# Patient Record
Sex: MALE | Race: BLACK OR AFRICAN AMERICAN | NOT HISPANIC OR LATINO | Employment: UNEMPLOYED | URBAN - METROPOLITAN AREA
[De-identification: names, ages, dates, MRNs, and addresses within clinical notes are randomized per-mention and may not be internally consistent; named-entity substitution may affect disease eponyms.]

---

## 2017-04-20 ENCOUNTER — OFFICE VISIT (OUTPATIENT)
Dept: MEDICAL GROUP | Facility: MEDICAL CENTER | Age: 62
End: 2017-04-20
Payer: COMMERCIAL

## 2017-04-20 VITALS
RESPIRATION RATE: 16 BRPM | BODY MASS INDEX: 30.66 KG/M2 | OXYGEN SATURATION: 95 % | TEMPERATURE: 98.3 F | HEIGHT: 71 IN | WEIGHT: 219 LBS | HEART RATE: 86 BPM | DIASTOLIC BLOOD PRESSURE: 78 MMHG | SYSTOLIC BLOOD PRESSURE: 132 MMHG

## 2017-04-20 DIAGNOSIS — J44.9 CHRONIC OBSTRUCTIVE PULMONARY DISEASE, UNSPECIFIED COPD TYPE (HCC): ICD-10-CM

## 2017-04-20 DIAGNOSIS — R05.9 COUGH: ICD-10-CM

## 2017-04-20 DIAGNOSIS — I10 HTN (HYPERTENSION), BENIGN: Chronic | ICD-10-CM

## 2017-04-20 PROCEDURE — 99214 OFFICE O/P EST MOD 30 MIN: CPT | Performed by: INTERNAL MEDICINE

## 2017-04-20 NOTE — MR AVS SNAPSHOT
"        Zander Domínguez   2017 3:40 PM   Office Visit   MRN: [id number]    Department:  [address] [place] [address]   Dept Phone:  [phone]    Description:  Male : 1955   Provider:  Perfecto Reynaga M.D.           Reason for Visit     Follow-Up COPD      Allergies as of 2017     No Known Allergies      You were diagnosed with     HTN (hypertension), benign   [113245]       Chronic obstructive pulmonary disease, unspecified COPD type (CMS-HCC)   [1626354]       Cough   [786.2.ICD-9-CM]         Vital Signs     Blood Pressure Pulse Temperature Respirations Height Weight    132/78 mmHg 86 36.8 °C (98.3 °F) 16 1.803 m (5' 11\") 99.338 kg (219 lb)    Body Mass Index Oxygen Saturation Smoking Status             30.56 kg/m2 95% Former Smoker         Basic Information     Date Of Birth Sex Race Ethnicity Preferred Language    1955 Male Black or  Non- English      Your appointments     2017  3:40 PM   Established Patient with Perfecto Reynaga M.D.   [facility] [address] ([facility])    [address] [place] [address]  [facility] [address]   [phone]           You will be receiving a confirmation call a few days before your appointment from our automated call confirmation system.              Problem List              ICD-10-CM Priority Class Noted - Resolved    HTN (hypertension), benign (Chronic) I10   3/16/2012 - Present    Back pain M54.9   2012 - Present    Dyslipidemia E78.5   2015 - Present    COPD J44.9   5/3/2016 - Present    History of tobacco use Z87.891   2016 - Present      Health Maintenance        Date Due Completion Dates    IMM ZOSTER VACCINE 2015 ---    COLONOSCOPY 2018 (Done), 5/3/2012 (Done)    Override on 2015: Done    Override on 5/3/2012: Done    IMM DTaP/Tdap/Td Vaccine (2 - Td) 2022            Current Immunizations     Pneumococcal polysaccharide vaccine (PPSV-23) 2016    Tdap Vaccine 2012  " 9:35 AM      Below and/or attached are the medications your provider expects you to take. Review all of your home medications and newly ordered medications with your provider and/or pharmacist. Follow medication instructions as directed by your provider and/or pharmacist. Please keep your medication list with you and share with your provider. Update the information when medications are discontinued, doses are changed, or new medications (including over-the-counter products) are added; and carry medication information at all times in the event of emergency situations     Allergies:  No Known Allergies          Medications  Valid as of: April 20, 2017 -  3:23 PM    Generic Name Brand Name Tablet Size Instructions for use    Albuterol Sulfate (Aero Soln) albuterol 108 (90 BASE) MCG/ACT Inhale 2 Puffs by mouth every four hours as needed for Shortness of Breath. AS NEEDED FOR SHORTNESS OF BREATH        AmLODIPine Besylate (Tab) NORVASC 5 MG Take 1 Tab by mouth every day.        Ipratropium-Albuterol (Aero Soln) COMBIVENT RESPIMAT  MCG/ACT Inhale 1 Puff by mouth 2 Times a Day.        Lisinopril-Hydrochlorothiazide (Tab) PRINZIDE, ZESTORETIC 20-12.5 MG Take 1 Tab by mouth 2 times a day. TWICE DAILY        MethylPREDNISolone (Kit) MEDROL DOSEPACK 4 MG Per package insert.        Umeclidinium-Vilanterol (AEROSOL POWDER, BREATH ACTIVATED) ANORO ELLIPTA 62.5-25 MCG/INH Inhale 1 Puff by mouth every day.        .                 Medicines prescribed today were sent to:     [id number]  [place], [place] - [address] [facility] [place] [address]    Phone: [phone] Fax: [phone]    Open 24 Hours?: No      Medication refill instructions:       If your prescription bottle indicates you have medication refills left, it is not necessary to call your provider’s office. Please contact your pharmacy and they will refill your medication.    If your prescription bottle  indicates you do not have any refills left, you may request refills at any time through one of the following ways: The online [employer] system (except Urgent Care), by calling your provider’s office, or by asking your pharmacy to contact your provider’s office with a refill request. Medication refills are processed only during regular business hours and may not be available until the next business day. Your provider may request additional information or to have a follow-up visit with you prior to refilling your medication.   *Please Note: Medication refills are assigned a new Rx number when refilled electronically. Your pharmacy may indicate that no refills were authorized even though a new prescription for the same medication is available at the pharmacy. Please request the medicine by name with the pharmacy before contacting your provider for a refill.        Your To Do List     Future Labs/Procedures Complete By Expires    DX-CHEST-2 VIEWS  As directed 4/20/2018         [employer] Status: Patient Declined

## 2017-04-20 NOTE — PROGRESS NOTES
"CC: Follow-up multiple issues    HPI:   Zander presents today with the following.    1. HTN (hypertension), benign  Blood pressure well controlled denying any chest pain shortness breath reports compliance with medication.    2. Chronic obstructive pulmonary disease, unspecified COPD type (CMS-HCC)  Reports compliance to his inhalers but is having some increased difficulty breathing intermittently. He does not uses [employer] inhaler frequently. He is well overdue for blood work that we had ordered. He was set up for a lung cancer screening but cannot afford the co-pay.    3. Cough  He is having a mild cough again no fevers or hemoptysis. No sore throat or earaches.      Patient Active Problem List    Diagnosis Date Noted   • History of tobacco use 08/05/2016   • COPD 05/03/2016   • Dyslipidemia 07/20/2015   • Back pain 05/25/2012   • HTN (hypertension), benign 03/16/2012       Current Outpatient Prescriptions   Medication Sig Dispense Refill   • ANORO ELLIPTA 62.5-25 MCG/INH AEROSOL POWDER, BREATH ACTIVATED Inhale 1 Puff by mouth every day. 1 Each 5   • amlodipine (NORVASC) 5 MG Tab Take 1 Tab by mouth every day. 90 Tab 3   • ipratropium-albuterol (COMBIVENT RESPIMAT)  MCG/ACT Aero Soln Inhale 1 Puff by mouth 2 Times a Day. 1 Inhaler 11   • albuterol (VENTOLIN HFA) 108 (90 BASE) MCG/ACT Aero Soln inhalation aerosol Inhale 2 Puffs by mouth every four hours as needed for Shortness of Breath. AS NEEDED FOR SHORTNESS OF BREATH 18 g 6   • lisinopril-hydrochlorothiazide (PRINZIDE, ZESTORETIC) 20-12.5 MG per tablet Take 1 Tab by mouth 2 times a day. TWICE DAILY 180 Tab 3   • methylPREDNISolone (MEDROL DOSEPACK) 4 MG tablet Per package insert. 1 Kit 0     No current facility-administered medications for this visit.         Allergies as of 04/20/2017   • (No Known Allergies)        ROS: As per HPI.    /78 mmHg  Pulse 86  Temp(Src) 36.8 °C (98.3 °F)  Resp 16  Ht 1.803 m (5' 11\")  Wt 99.338 kg (219 lb)  BMI " 30.56 kg/m2  SpO2 95%    Physical Exam:  Gen:         Alert and oriented, No apparent distress.  Neck:        No Lymphadenopathy or Bruits.  Lungs:     Clear to auscultation bilaterally  CV:          Regular rate and rhythm. No murmurs, rubs or gallops.               Ext:          No clubbing, cyanosis, edema.      Assessment and Plan.   61 y.o. male with the following issues.    1. HTN (hypertension), benign  Currently well controlled, Discuss diet, exercise and salt restriction. Encouraged to get blood work done.    2. Chronic obstructive pulmonary disease, unspecified COPD type (CMS-HCC)  Continue current inhalers one over proper timing for medications and rescue inhalers.  - DX-CHEST-2 VIEWS; Future    3. Cough  Have written for chest x-ray.  - DX-CHEST-2 VIEWS; Future

## 2017-05-18 ENCOUNTER — HOSPITAL ENCOUNTER (OUTPATIENT)
Dept: RADIOLOGY | Facility: MEDICAL CENTER | Age: 62
End: 2017-05-18
Attending: INTERNAL MEDICINE
Payer: COMMERCIAL

## 2017-05-18 ENCOUNTER — HOSPITAL ENCOUNTER (OUTPATIENT)
Dept: LAB | Facility: MEDICAL CENTER | Age: 62
End: 2017-05-18
Attending: INTERNAL MEDICINE
Payer: COMMERCIAL

## 2017-05-18 DIAGNOSIS — J44.9 CHRONIC OBSTRUCTIVE PULMONARY DISEASE, UNSPECIFIED COPD TYPE (HCC): ICD-10-CM

## 2017-05-18 DIAGNOSIS — E78.5 DYSLIPIDEMIA: ICD-10-CM

## 2017-05-18 DIAGNOSIS — R05.9 COUGH: ICD-10-CM

## 2017-05-18 LAB
ALBUMIN SERPL BCP-MCNC: 4.1 G/DL (ref 3.2–4.9)
ALBUMIN/GLOB SERPL: 1.5 G/DL
ALP SERPL-CCNC: 54 U/L (ref 30–99)
ALT SERPL-CCNC: 23 U/L (ref 2–50)
ANION GAP SERPL CALC-SCNC: 6 MMOL/L (ref 0–11.9)
AST SERPL-CCNC: 19 U/L (ref 12–45)
BILIRUB SERPL-MCNC: 0.9 MG/DL (ref 0.1–1.5)
BUN SERPL-MCNC: 11 MG/DL (ref 8–22)
CALCIUM SERPL-MCNC: 9.8 MG/DL (ref 8.5–10.5)
CHLORIDE SERPL-SCNC: 102 MMOL/L (ref 96–112)
CHOLEST SERPL-MCNC: 218 MG/DL (ref 100–199)
CO2 SERPL-SCNC: 29 MMOL/L (ref 20–33)
CREAT SERPL-MCNC: 0.95 MG/DL (ref 0.5–1.4)
GFR SERPL CREATININE-BSD FRML MDRD: >60 ML/MIN/1.73 M 2
GLOBULIN SER CALC-MCNC: 2.8 G/DL (ref 1.9–3.5)
GLUCOSE SERPL-MCNC: 97 MG/DL (ref 65–99)
HDLC SERPL-MCNC: 70 MG/DL
LDLC SERPL CALC-MCNC: 137 MG/DL
POTASSIUM SERPL-SCNC: 3.9 MMOL/L (ref 3.6–5.5)
PROT SERPL-MCNC: 6.9 G/DL (ref 6–8.2)
SODIUM SERPL-SCNC: 137 MMOL/L (ref 135–145)
TRIGL SERPL-MCNC: 53 MG/DL (ref 0–149)

## 2017-05-18 PROCEDURE — 80061 LIPID PANEL: CPT

## 2017-05-18 PROCEDURE — 36415 COLL VENOUS BLD VENIPUNCTURE: CPT

## 2017-05-18 PROCEDURE — 80053 COMPREHEN METABOLIC PANEL: CPT

## 2017-05-18 PROCEDURE — 71020 DX-CHEST-2 VIEWS: CPT

## 2017-06-08 ENCOUNTER — NON-PROVIDER VISIT (OUTPATIENT)
Dept: OCCUPATIONAL MEDICINE | Facility: CLINIC | Age: 62
End: 2017-06-08

## 2017-06-08 DIAGNOSIS — Z02.1 PRE-EMPLOYMENT DRUG SCREENING: ICD-10-CM

## 2017-06-08 LAB
AMP AMPHETAMINE: NORMAL
COC COCAINE: NORMAL
INT CON NEG: NORMAL
INT CON POS: NORMAL
MET METHAMPHETAMINES: NORMAL
OPI OPIATES: NORMAL
PCP PHENCYCLIDINE: NORMAL
POC DRUG COMMENT 753798-OCCUPATIONAL HEALTH: NEGATIVE
THC: NORMAL

## 2017-06-08 PROCEDURE — 80305 DRUG TEST PRSMV DIR OPT OBS: CPT | Performed by: PREVENTIVE MEDICINE

## 2017-08-18 ENCOUNTER — NON-PROVIDER VISIT (OUTPATIENT)
Dept: OCCUPATIONAL MEDICINE | Facility: CLINIC | Age: 62
End: 2017-08-18

## 2017-08-18 ENCOUNTER — EMPLOYEE HEALTH (OUTPATIENT)
Dept: OCCUPATIONAL MEDICINE | Facility: CLINIC | Age: 62
End: 2017-08-18

## 2017-08-18 DIAGNOSIS — Z02.1 PRE-EMPLOYMENT HEALTH SCREENING EXAMINATION: ICD-10-CM

## 2017-08-18 PROCEDURE — 80305 DRUG TEST PRSMV DIR OPT OBS: CPT | Performed by: PREVENTIVE MEDICINE

## 2017-08-18 PROCEDURE — 7100 PR DOT PHYSICAL: Performed by: PREVENTIVE MEDICINE

## 2017-11-29 DIAGNOSIS — I10 HTN (HYPERTENSION), BENIGN: Chronic | ICD-10-CM

## 2017-11-29 RX ORDER — AMLODIPINE BESYLATE 5 MG/1
TABLET ORAL
Qty: 90 TAB | Refills: 0 | Status: SHIPPED | OUTPATIENT
Start: 2017-11-29 | End: 2017-12-12

## 2017-12-07 ENCOUNTER — OFFICE VISIT (OUTPATIENT)
Dept: INTERNAL MEDICINE | Facility: MEDICAL CENTER | Age: 62
End: 2017-12-07
Payer: MEDICAID

## 2017-12-07 VITALS
TEMPERATURE: 98.3 F | BODY MASS INDEX: 32.9 KG/M2 | HEART RATE: 83 BPM | SYSTOLIC BLOOD PRESSURE: 142 MMHG | WEIGHT: 235 LBS | DIASTOLIC BLOOD PRESSURE: 94 MMHG | OXYGEN SATURATION: 90 % | HEIGHT: 71 IN

## 2017-12-07 DIAGNOSIS — I10 HYPERTENSION, UNSPECIFIED TYPE: ICD-10-CM

## 2017-12-07 DIAGNOSIS — Z29.9 PREVENTIVE MEASURE: ICD-10-CM

## 2017-12-07 DIAGNOSIS — Z86.39 HISTORY OF VITAMIN D DEFICIENCY: ICD-10-CM

## 2017-12-07 DIAGNOSIS — J44.9 CHRONIC OBSTRUCTIVE PULMONARY DISEASE, UNSPECIFIED COPD TYPE (HCC): ICD-10-CM

## 2017-12-07 DIAGNOSIS — E78.5 DYSLIPIDEMIA: ICD-10-CM

## 2017-12-07 DIAGNOSIS — E66.9 OBESITY (BMI 30-39.9): ICD-10-CM

## 2017-12-07 DIAGNOSIS — J44.1 ACUTE EXACERBATION OF CHRONIC OBSTRUCTIVE PULMONARY DISEASE (COPD) (HCC): ICD-10-CM

## 2017-12-07 PROCEDURE — 99204 OFFICE O/P NEW MOD 45 MIN: CPT | Mod: GC | Performed by: INTERNAL MEDICINE

## 2017-12-07 RX ORDER — ALBUTEROL SULFATE 2.5 MG/3ML
2.5 SOLUTION RESPIRATORY (INHALATION) ONCE
Status: COMPLETED | OUTPATIENT
Start: 2017-12-07 | End: 2017-12-07

## 2017-12-07 RX ORDER — METHYLPREDNISOLONE 4 MG/1
TABLET ORAL
Qty: 21 TAB | Refills: 0 | Status: SHIPPED | OUTPATIENT
Start: 2017-12-07 | End: 2018-03-28

## 2017-12-07 RX ORDER — TIOTROPIUM BROMIDE 18 UG/1
18 CAPSULE ORAL; RESPIRATORY (INHALATION) DAILY
Qty: 30 CAP | Refills: 3 | Status: SHIPPED | OUTPATIENT
Start: 2017-12-07 | End: 2018-08-17

## 2017-12-07 RX ORDER — IPRATROPIUM BROMIDE AND ALBUTEROL SULFATE 2.5; .5 MG/3ML; MG/3ML
3 SOLUTION RESPIRATORY (INHALATION) ONCE
Status: DISCONTINUED | OUTPATIENT
Start: 2017-12-07 | End: 2017-12-07

## 2017-12-07 RX ADMIN — ALBUTEROL SULFATE 2.5 MG: 2.5 SOLUTION RESPIRATORY (INHALATION) at 15:04

## 2017-12-07 ASSESSMENT — PATIENT HEALTH QUESTIONNAIRE - PHQ9: CLINICAL INTERPRETATION OF PHQ2 SCORE: 0

## 2017-12-07 NOTE — PATIENT INSTRUCTIONS
Chronic Obstructive Pulmonary Disease  Chronic obstructive pulmonary disease (COPD) is a common lung problem. In COPD, the flow of air from the lungs is limited. The way your lungs work will probably never return to normal, but there are things you can do to improve your lungs and make yourself feel better. Your doctor may treat your condition with:  · Medicines.  · Oxygen.  · Lung surgery.  · Changes to your diet.  · Rehabilitation. This may involve a team of specialists.  HOME CARE  · Take all medicines as told by your doctor.  · Avoid medicines or cough syrups that dry up your airway (such as antihistamines) and do not allow you to get rid of thick spit. You do not need to avoid them if told differently by your doctor.  · If you smoke, stop. Smoking makes the problem worse.  · Avoid being around things that make your breathing worse (like smoke, chemicals, and fumes).  · Use oxygen therapy and therapy to help improve your lungs (pulmonary rehabilitation) if told by your doctor. If you need home oxygen therapy, ask your doctor if you should buy a tool to measure your oxygen level (oximeter).  · Avoid people who have a sickness you can catch (contagious).  · Avoid going outside when it is very hot, cold, or humid.  · Eat healthy foods. Eat smaller meals more often. Rest before meals.  · Stay active, but remember to also rest.  · Make sure to get all the shots (vaccines) your doctor recommends. Ask your doctor if you need a pneumonia shot.  · Learn and use tips on how to relax.  · Learn and use tips on how to control your breathing as told by your doctor. Try:  ¨ Breathing in (inhaling) through your nose for 1 second. Then, pucker your lips and breath out (exhale) through your lips for 2 seconds.  ¨ Putting one hand on your belly (abdomen). Breathe in slowly through your nose for 1 second. Your hand on your belly should move out. Pucker your lips and breathe out slowly through your lips. Your hand on your belly  should move in as you breathe out.  · Learn and use controlled coughing to clear thick spit from your lungs. The steps are:  1. Lean your head a little forward.  2. Breathe in deeply.  3. Try to hold your breath for 3 seconds.  4. Keep your mouth slightly open while coughing 2 times.  5. Spit any thick spit out into a tissue.  6. Rest and do the steps again 1 or 2 times as needed.  GET HELP IF:  · You cough up more thick spit than usual.  · There is a change in the color or thickness of the spit.  · It is harder to breathe than usual.  · Your breathing is faster than usual.  GET HELP RIGHT AWAY IF:  · You have shortness of breath while resting.  · You have shortness of breath that stops you from:  ¨ Being able to talk.  ¨ Doing normal activities.  · You chest hurts for longer than 5 minutes.  · Your skin color is more blue than usual.  · Your pulse oximeter shows that you have low oxygen for longer than 5 minutes.  MAKE SURE YOU:  · Understand these instructions.  · Will watch your condition.  · Will get help right away if you are not doing well or get worse.     This information is not intended to replace advice given to you by your health care provider. Make sure you discuss any questions you have with your health care provider.     Document Released: 06/05/2009 Document Revised: 01/08/2016 Document Reviewed: 08/14/2014  [employer] Interactive Patient Education ©2016 [employer]

## 2017-12-08 NOTE — PROGRESS NOTES
Established Patient    Zander presents today with the following:    CC: SOB    HPI: 62 yr old male patient with PMHx of COPD ( never had PFT's in the past ) and HTN comes in for evaluation of ongoing SOB.  Patient states since three days her SOB has suddenly gotten worse associated with cough with yellow colored sputum. Initially sputum production was quite large and now has decreased in amount.  SOB was initially present on exertion but progressed and now he is experiencing SOB on rest for the past 2 days.  Denies any fever, chills, chest pain, palpitations , diarrhea,constipation, weight changes.  Patient uses ventolin and Anoro Ellipta inhalers at home as needed. But using these inhalers did not help his SOB. Patient has been diagnosed with COPD by his prior PCP who ordered PFT's and referral to pulmonologist in the past but patient could not do it due to insurance issues and various other problems.  At baseline his SOB on exertion is under control with Ventolin and Anoro ellipta.  Patient works at [employer]. Does not know if somebody was sick around him.      Patient Active Problem List    Diagnosis Date Noted   • Obesity (BMI 30-39.9) 12/07/2017   • History of tobacco use 08/05/2016   • COPD 05/03/2016   • Dyslipidemia 07/20/2015   • Back pain 05/25/2012   • HTN (hypertension), benign 03/16/2012       Current Outpatient Prescriptions   Medication Sig Dispense Refill   • MethylPREDNISolone (MEDROL DOSEPAK) 4 MG Tablet Therapy Pack As directed on the packaging label. 21 Tab 0   • tiotropium (SPIRIVA) 18 MCG Cap Inhale 1 Cap by mouth every day. 30 Cap 3   • amlodipine (NORVASC) 5 MG Tab TAKE 1 TABLET BY MOUTH ONCE DAILY 90 Tab 0   • lisinopril-hydrochlorothiazide (PRINZIDE, ZESTORETIC) 20-12.5 MG per tablet TAKE 1 TABLET BY MOUTH TWICE DAILY 180 Tab 1   • VENTOLIN  (90 BASE) MCG/ACT Aero Soln inhalation aerosol INHALE 2 PUFFS BY MOUTH EVERY 4 HOURS AS NEEDED FOR SHORTNESS OF BREATH 18 g 6   •  "ANORO ELLIPTA 62.5-25 MCG/INH AEROSOL POWDER, BREATH ACTIVATED INHALE 1 PUFF BY MOUTH EVERY DAY 1 Each 11     No current facility-administered medications for this visit.        Social History     Social History   • Marital status:      Spouse name: N/A   • Number of children: N/A   • Years of education: N/A     Occupational History   • Not on file.     Social History Main Topics   • Smoking status: Former Smoker     Packs/day: 1.00     Years: 30.00     Types: Cigarettes     Quit date: 1/1/2006   • Smokeless tobacco: Never Used   • Alcohol use 0.0 oz/week      Comment: rare   • Drug use: No   • Sexual activity: Yes     Partners: Male     Other Topics Concern   • Not on file     Social History Narrative   • No narrative on file       Family History   Problem Relation Age of Onset   • Hypertension Mother    • Diabetes Neg Hx    • Cancer Neg Hx    • Psychiatry Neg Hx    • Heart Disease Neg Hx        ROS: As per HPI. Additional pertinent symptoms as noted below.  Constitutional: Denies fever/chills/weight changes.   Eyes: Denies changes/pain in vision  ENT: Denies sore throat/congestion/ear ache.   Cardiovascular: Denies chest pain /palpitations/edema.   Respiratory: Positive for SOB with cough and yellow colored sputum  Abdomen: Denies difficulty swallowing/ diarrhea/constipation/abdominal pain/nausea/vomiting  Genitourinary: Normal urinary habits.   Musculo-skeletal: normal ambulation.Denies muscle or joint pains.  Skin: Denies rash/lesions.  Neurological: Denies weakness/tingling/numbness/headache  Psychological: good mood and cooperative. Denies anxiety /depression       /94   Pulse 83   Temp 36.8 °C (98.3 °F)   Ht 1.803 m (5' 11\")   Wt 106.6 kg (235 lb)   SpO2 90%   BMI 32.78 kg/m²     Physical Exam  General:  Alert and oriented, No apparent distress.    Eyes: Pupils equal and reactive. No scleral icterus.    Throat: Clear no erythema or exudates noted.    Neck: Supple. No lymphadenopathy noted. " Thyroid not enlarged.    Lungs:Diffuse wheezing with decreased bilateral breath sounds noted.     Cardiovascular: Regular rate and rhythm. No murmurs, rubs or gallops.    Abdomen:  Benign. No rebound or guarding noted.    Extremities: No clubbing, cyanosis, edema.    Skin: Clear. No rash or suspicious skin lesions noted.    Neurological: Oriented to time, place, and person .Cranial nerves intact. No motor/sensory deficits.Reflexes were normal and symmetrical in both upper and lower extremities     Musculoskeletal : NROM of all extremities. No tenderness or deformity noted.     Note: I have reviewed all pertinent labs and diagnostic tests associated with this visit with specific comments listed under the assessment and plan below      Assessment and Plan    1. Acute exacerbation of chronic obstructive pulmonary disease (COPD) (CMS-[facility])  - patient has long standing history of COPD but never seen pulmonologist and never had PFT's done  - on ventolin and Anoro ellipta at home as needed  - prior PCP ordered combivent but patient unable to take it because of insurance issues and co pay  - patient was saturating 87 % in the clinic today and diffuse wheezing bilaterally .  - a dose of proventil nebulizer was administered and his SOB improved and currently saturating 90 %  - ordered tiotropium inhaler and medrol dosepak and advised to use and counseled to be compliant  - ordered CBC, CMP, referral to Pulmonologist and chest x rays  - will follow up in 1 week    2. Hypertension, unspecified type  - has long standing history  - today BP is 142/94  - currently on amlodipine and lisinopril-HCTZ   - advised to continue and will follow up in a week with CMP    3. Dyslipidemia  - patient's last lipid profile was in may 2017   Ref. Range 5/18/2017 09:23   Cholesterol,Tot Latest Ref Range: 100 - 199 mg/dL 218 (H)   Triglycerides Latest Ref Range: 0 - 149 mg/dL 53   HDL Latest Ref Range: >=40 mg/dL 70   LDL Latest Ref Range: <100  mg/dL 137 (H)   - currently on no medications  - ASCVD risk score of 16.2 % and will consider starting on atorvastatin and aspirin in his next visit in a week, ordered lipid panel   - encouraged lifestyle modifications- good diet -DASH diet and given his ongoing COPD exacerbation- will consider encouraging regular exercise next visit    4. Obesity (BMI 30-39.9)  - advised lifestyle modifications with DASH diet  - will repeat lipid panel    5. History of vitamin D deficiency  -  His last vitamin D level in 2012 is 7  - ordered vitamin D level    6. Preventive measure  - denies vaccinations today  - due for his flu vaccination  - had PPSV23 in 2016  - had Tdap in 2012  - due for his colonoscopy next year  - will consider ordering DEXA in his next visit        Signed by: Nichole Wood M.D.

## 2017-12-12 ENCOUNTER — OFFICE VISIT (OUTPATIENT)
Dept: INTERNAL MEDICINE | Facility: MEDICAL CENTER | Age: 62
End: 2017-12-12
Payer: MEDICAID

## 2017-12-12 VITALS
WEIGHT: 233.8 LBS | SYSTOLIC BLOOD PRESSURE: 151 MMHG | HEART RATE: 81 BPM | DIASTOLIC BLOOD PRESSURE: 100 MMHG | RESPIRATION RATE: 18 BRPM | HEIGHT: 71 IN | OXYGEN SATURATION: 92 % | BODY MASS INDEX: 32.73 KG/M2 | TEMPERATURE: 97.6 F

## 2017-12-12 DIAGNOSIS — I10 HYPERTENSION, UNSPECIFIED TYPE: ICD-10-CM

## 2017-12-12 DIAGNOSIS — E78.5 DYSLIPIDEMIA: ICD-10-CM

## 2017-12-12 DIAGNOSIS — Z29.9 PREVENTIVE MEASURE: ICD-10-CM

## 2017-12-12 DIAGNOSIS — J44.9 CHRONIC OBSTRUCTIVE PULMONARY DISEASE, UNSPECIFIED COPD TYPE (HCC): ICD-10-CM

## 2017-12-12 DIAGNOSIS — I10 HTN (HYPERTENSION), BENIGN: Chronic | ICD-10-CM

## 2017-12-12 PROCEDURE — 99214 OFFICE O/P EST MOD 30 MIN: CPT | Mod: GC | Performed by: INTERNAL MEDICINE

## 2017-12-12 RX ORDER — ATORVASTATIN CALCIUM 40 MG/1
40 TABLET, FILM COATED ORAL
Qty: 30 TAB | Refills: 6 | Status: SHIPPED | OUTPATIENT
Start: 2017-12-12 | End: 2017-12-19 | Stop reason: RX

## 2017-12-12 RX ORDER — AMLODIPINE BESYLATE 10 MG/1
10 TABLET ORAL DAILY
Qty: 30 TAB | Refills: 3 | Status: SHIPPED | OUTPATIENT
Start: 2017-12-12 | End: 2018-04-16 | Stop reason: SDUPTHER

## 2017-12-12 ASSESSMENT — PAIN SCALES - GENERAL: PAINLEVEL: NO PAIN

## 2017-12-12 NOTE — PROGRESS NOTES
Established Patient    Zander presents today with the following:    CC: follow up of SOB    HPI: 62 yr old male patient with PMHx of COPD ( never had PFT's in the past ) and HTN comes in for one week follow up of SOB.  Patient was last seen in the clinic last week for SOB that has suddenly gotten worse associated with cough with yellow colored sputum. Patient stated he was diagnosed with COPD by his prior PCP.  Last week he was saturating 87 % in the clinic - one dose of albuterol nebulizer was administered in the clinic and continued on ventolin and Anoro elipta. Was given prescription of tiotropium and medrol pack.  Patient states he is feeling better and his symptoms have improved. He currently is on the tapering dose of Medrol dosepak. Referral to pulmonologist was ordered for PFTs which he never had in the past and referral is still pending.  Patient denies SOB on exertion and wheezing.He has mild dry cough with no sputum production.  Denies any fever, chills, chest pain, palpitations , diarrhea,constipation, weight changes.      Patient Active Problem List    Diagnosis Date Noted   • Obesity (BMI 30-39.9) 12/07/2017   • History of tobacco use 08/05/2016   • COPD 05/03/2016   • Dyslipidemia 07/20/2015   • Back pain 05/25/2012   • HTN (hypertension), benign 03/16/2012       Current Outpatient Prescriptions   Medication Sig Dispense Refill   • aspirin EC (ECOTRIN) 81 MG Tablet Delayed Response Take 1 Tab by mouth every day. 30 Tab 6   • atorvastatin (LIPITOR) 40 MG Tab Take 1 Tab by mouth every bedtime. 30 Tab 6   • amlodipine (NORVASC) 10 MG Tab Take 1 Tab by mouth every day. 30 Tab 3   • MethylPREDNISolone (MEDROL DOSEPAK) 4 MG Tablet Therapy Pack As directed on the packaging label. 21 Tab 0   • tiotropium (SPIRIVA) 18 MCG Cap Inhale 1 Cap by mouth every day. 30 Cap 3   • lisinopril-hydrochlorothiazide (PRINZIDE, ZESTORETIC) 20-12.5 MG per tablet TAKE 1 TABLET BY MOUTH TWICE DAILY 180 Tab 1   • VENTOLIN HFA  "108 (90 BASE) MCG/ACT Aero Soln inhalation aerosol INHALE 2 PUFFS BY MOUTH EVERY 4 HOURS AS NEEDED FOR SHORTNESS OF BREATH 18 g 6   • ANORO ELLIPTA 62.5-25 MCG/INH AEROSOL POWDER, BREATH ACTIVATED INHALE 1 PUFF BY MOUTH EVERY DAY 1 Each 11     No current facility-administered medications for this visit.        Social History     Social History   • Marital status:      Spouse name: N/A   • Number of children: N/A   • Years of education: N/A     Occupational History   • Not on file.     Social History Main Topics   • Smoking status: Former Smoker     Packs/day: 1.00     Years: 30.00     Types: Cigarettes     Quit date: 1/1/2006   • Smokeless tobacco: Never Used   • Alcohol use 0.0 oz/week      Comment: rare   • Drug use: No   • Sexual activity: Yes     Partners: Male     Other Topics Concern   • Not on file     Social History Narrative   • No narrative on file       Family History   Problem Relation Age of Onset   • Hypertension Mother    • Diabetes Neg Hx    • Cancer Neg Hx    • Psychiatry Neg Hx    • Heart Disease Neg Hx        ROS: As per HPI. Additional pertinent symptoms as noted below.    Constitutional: Denies fever/chills/weight changes.   Eyes: Denies changes/pain in vision  ENT: Denies sore throat/congestion/ear ache.   Cardiovascular: Denies chest pain /palpitations/edema.   Respiratory: Denies SOB/cough/PND/orthopnea  Abdomen: Denies difficulty swallowing/ diarrhea/constipation/abdominal pain/nausea/vomiting  Genitourinary: Normal urinary habits.   Musculo-skeletal: normal ambulation.Denies muscle or joint pains.  Skin: Denies rash/lesions.  Neurological: Denies weakness/tingling/numbness/headache  Psychological: good mood and cooperative. Denies anxiety /depression       /100   Pulse 81   Temp 36.4 °C (97.6 °F)   Resp 18   Ht 1.803 m (5' 11\")   Wt 106.1 kg (233 lb 12.8 oz)   SpO2 92%   BMI 32.61 kg/m²     Physical Exam  General:  Alert and oriented, No apparent distress.    Eyes: " Pupils equal and reactive. No scleral icterus.    Throat: Clear no erythema or exudates noted.    Neck: Supple. No lymphadenopathy noted. Thyroid not enlarged.    Lungs: Clear to auscultation and percussion bilaterally.    Cardiovascular: Regular rate and rhythm. No murmurs, rubs or gallops.    Abdomen:  Benign. No rebound or guarding noted.    Extremities: No clubbing, cyanosis, edema.    Skin: Clear. No rash or suspicious skin lesions noted.    Neurological: Oriented to time, place, and person .Cranial nerves intact. No motor/sensory deficits.Reflexes were normal and symmetrical in both upper and lower extremities     Musculoskeletal : NROM of all extremities. No tenderness or deformity noted.     Note: I have reviewed all pertinent labs and diagnostic tests associated with this visit with specific comments listed under the assessment and plan below      Assessment and Plan    1. Hypertension, unspecified type  - has long standing history  - today BP is 151/100  - currently on amlodipine 5 mg PO once daily and lisinopril-HCTZ - 20-12.5 mg tablets  - denies any related symptoms and reports no side effects of medications.  - increased the dose of amlodipine 5 mg to 10 mg PO once daily   - will follow up the BP next week at nurse visit    2. Dyslipidemia  - patient's last lipid profile was in may 2017    Ref. Range 5/18/2017 09:23   Cholesterol,Tot Latest Ref Range: 100 - 199 mg/dL 218 (H)   Triglycerides Latest Ref Range: 0 - 149 mg/dL 53   HDL Latest Ref Range: >=40 mg/dL 70   LDL Latest Ref Range: <100 mg/dL 137 (H)   - currently on no medications  - ASCVD risk score of 16.2 % and starting on atorvastatin and aspirin today.ordered atorvastatin 40 mg PO once daily and aspirin 81 mg PO 1 tab once daily.  - encouraged lifestyle modifications- good diet -DASH diet and exercise regularly.    3. Chronic obstructive pulmonary disease, unspecified COPD type (CMS-HCC)  - patient has long standing history of COPD but never  seen pulmonologist and never had PFT's done  - was on ventolin and Anoro ellipta at home as needed but started on tiotropium and medrol dosepak last week because of acute exacerbation.  - last visit ordered CBC, CMP, referral to Pulmonologist which are still pending and chest x rays-  negative  - will continue to follow up    4. Preventive measure  - due for his flu vaccination  - had PPSV23 in 2016  - had Tdap in 2012  - due for his colonoscopy next year  - ordered DEXA       Signed by: Nichole Wood M.D.

## 2017-12-12 NOTE — PATIENT INSTRUCTIONS
"MY COPD ACTION PLAN     It is recommended that patients and physicians /healthcare providers complete this action plan together. This plan should be discussed at each physician visit and updated as needed.    The green, yellow and red zones show groups of symptoms of COPD. This list of symptoms is not comprehensive, and you may experience other symptoms. In the \"Actions\" column, your healthcare provider has recommended actions for you to take based on your symptoms.    Patient Name: Zander Domínguez   YOB: 1955   Last Updated on:     Green Zone:  I am doing well today Actions   •  Usual activitiy and exercise level •  Take daily medications   •  Usual amounts of cough and phlegm/mucus •  Use oxygen as prescribed   •  Sleep well at night •  Continue regular exercise/diet plan   •  Appetite is good •  At all times avoid cigarette smoke, inhaled irritants     Daily Medications (these medications are taken every day):               Yellow Zone:  I am having a bad day or a COPD flare Actions   •  More breathless than usual •  Continue daily medications   •  I have less energy for my daily activities •  Use quick relief inhaler as ordered   •  Increased or thicker phlegm/mucus •  Use oxygen as prescribed   •  Using quick relief inhaler/nebulizer more often •  Get plenty of rest   •  Swelling of ankles more than usual •  Use pursed lip breathing   •  More coughing than usual •  At all times avoid cigarette smoke, inhaled irritants   •  I feel like I have a \"chest cold\"   •  Poor sleep and my symptoms woke me up   •  My appetite is not good   •  My medicine is not helping    •  Call provider immediately if symptoms don’t improve     Continue daily medications, add rescue medications:               Medications to be used during a flare up, (as Discussed with Provider):             Red Zone:  I need urgent medical care Actions   •  Severe shortness of breath even at rest •  Call 911 or seek medical care " immediately   •  Not able to do any activity because of breathing    •  Fever or shaking chills    •  Feeling confused or very drowsy     •  Chest pains    •  Coughing up blood

## 2017-12-13 DIAGNOSIS — E66.9 OBESITY (BMI 30-39.9): ICD-10-CM

## 2017-12-13 DIAGNOSIS — I10 HYPERTENSION, UNSPECIFIED TYPE: ICD-10-CM

## 2017-12-13 DIAGNOSIS — J44.1 ACUTE EXACERBATION OF CHRONIC OBSTRUCTIVE PULMONARY DISEASE (COPD) (HCC): ICD-10-CM

## 2017-12-13 DIAGNOSIS — E78.5 DYSLIPIDEMIA: ICD-10-CM

## 2017-12-19 ENCOUNTER — TELEPHONE (OUTPATIENT)
Dept: INTERNAL MEDICINE | Facility: MEDICAL CENTER | Age: 62
End: 2017-12-19

## 2017-12-19 RX ORDER — ATORVASTATIN CALCIUM 40 MG/1
40 TABLET, FILM COATED ORAL DAILY
Qty: 30 TAB | Refills: 6 | Status: SHIPPED | OUTPATIENT
Start: 2017-12-19 | End: 2018-08-17

## 2017-12-19 NOTE — TELEPHONE ENCOUNTER
Cancelled previous order and ordered again today. Called the patient to convey that new prescription is sent to the pharmacy but he did not answer and I left the voice message

## 2017-12-19 NOTE — TELEPHONE ENCOUNTER
VOICEMAIL  1. Caller Name: Zander Domínguez   Call Back Number: [phone]       2. Message: [facility] did not receive my Atorvastatin please call it in.    3. Patient approves office to leave a detailed voicemail/MyChart message: N\A

## 2017-12-19 NOTE — TELEPHONE ENCOUNTER
Called the patient to discuss what was the problem and patient did not answer. Left voice message.

## 2017-12-19 NOTE — TELEPHONE ENCOUNTER
Called pharmacy to verify if they received medication and they stated they did, but insurance wont cover it. I asked if it needed prior auth and pharmacists explain it was not under patient's formulary so it would not get covered, but I can try if I liked too.      I tried calling patient to notify, wife answered and stated patient was driving and couldn't come to the phone.  I explained who I was and I did not have permission from patient to speak to wife regarding billing or treatment issues, so wife got upset and said patient was not available and hung up the phone.

## 2017-12-22 ENCOUNTER — TELEPHONE (OUTPATIENT)
Dept: INTERNAL MEDICINE | Facility: MEDICAL CENTER | Age: 62
End: 2017-12-22

## 2017-12-23 NOTE — TELEPHONE ENCOUNTER
DOCUMENTATION OF PAR STATUS:    1. Name of Medication & Dose: Lipitor 40mg     2. Name of Prescription Coverage Company & phone #: Health plan [facility] through [employer]    3. Date Prior Auth Submitted: 12/22/17    4. What information was given to obtain insurance decision? Diagnosis and notes    5. Prior Auth Letter Approved or Denied? Pending     6. Action Taken: Pharmacy/Patient Notified: No

## 2017-12-26 NOTE — TELEPHONE ENCOUNTER
Medication was Denied .    Patient needs to try and fail TWO of the following:  Simvastatin, Lovastatin, atorvastatin

## 2017-12-27 NOTE — TELEPHONE ENCOUNTER
Called insurance to ask why medication was denied.  They stated that they thought we are asking for lipitor and not Atorvastatin.  So I explained that the pharmacy was the one who sent us a rejection for atorvastatin and that's why the prior authorization was approved.  Insurance explained that they are not sure why they sent us a request for prior authorization on the medication when it was paid for on 12/19/17.      Called pharmacy and explained what the insurance had stated and they stated they said they are sorry for sending the request, it was there mistake.

## 2018-01-04 ENCOUNTER — HOSPITAL ENCOUNTER (OUTPATIENT)
Dept: RADIOLOGY | Facility: MEDICAL CENTER | Age: 63
End: 2018-01-04
Attending: INTERNAL MEDICINE
Payer: MEDICAID

## 2018-01-04 DIAGNOSIS — Z92.241 HISTORY OF RECENT STEROID USE: ICD-10-CM

## 2018-01-04 PROCEDURE — 77080 DXA BONE DENSITY AXIAL: CPT

## 2018-01-16 ENCOUNTER — TELEPHONE (OUTPATIENT)
Dept: INTERNAL MEDICINE | Facility: MEDICAL CENTER | Age: 63
End: 2018-01-16

## 2018-01-16 NOTE — TELEPHONE ENCOUNTER
DOCUMENTATION OF PAR STATUS:    1. Name of Medication & Dose: Spircaio Herrerar  18mcg    2. Name of Prescription Coverage Company & phone #: Health plan [facility] through [employer]    3. Date Prior Auth Submitted: 1/16/18    4. What information was given to obtain insurance decision? Notes and diagnoses     5. Prior Auth Letter Approved or Denied? Pending     6. Action Taken: Pharmacy/Patient Notified: Yes

## 2018-01-17 NOTE — TELEPHONE ENCOUNTER
Patient needs to follow up with pulmonologist which is still pending. Patient was on Anoro Ellipta and his symptoms were uncontrolled.

## 2018-01-17 NOTE — TELEPHONE ENCOUNTER
Medication was denied.     Patient needs to try and fail 1 of the following Incruse Ellipta, Breo Ellipta and Fluticasone/ salmeterol( Generic Airduo.    If Breo or Fluticasone/ Salmeterol is prescribed, it would also require prior authorizarion.     Please advised.

## 2018-01-18 NOTE — TELEPHONE ENCOUNTER
Please let the patient know he needs to follow up with pulmonologist and he needs to have PFT's done. Patient was on anoro ellipta with uncontrolled symptoms.   Please let insurance know he was on Anoro Ellipta.

## 2018-01-18 NOTE — TELEPHONE ENCOUNTER
Patient's wife Autumn called back and wanted to know why we called, but we don't have permission from patient to speak to hear. She will have  call back.

## 2018-01-24 NOTE — TELEPHONE ENCOUNTER
Called patient and notified.  He has the appointment scheduled with the pulmonologist on 1/31/18.

## 2018-03-12 ENCOUNTER — OFFICE VISIT (OUTPATIENT)
Dept: INTERNAL MEDICINE | Facility: MEDICAL CENTER | Age: 63
End: 2018-03-12
Payer: MEDICAID

## 2018-03-12 VITALS
SYSTOLIC BLOOD PRESSURE: 146 MMHG | WEIGHT: 236.8 LBS | HEART RATE: 94 BPM | DIASTOLIC BLOOD PRESSURE: 84 MMHG | RESPIRATION RATE: 19 BRPM | BODY MASS INDEX: 33.15 KG/M2 | OXYGEN SATURATION: 92 % | HEIGHT: 71 IN | TEMPERATURE: 98.5 F

## 2018-03-12 DIAGNOSIS — E66.9 OBESITY (BMI 30-39.9): ICD-10-CM

## 2018-03-12 DIAGNOSIS — I10 HYPERTENSION, UNSPECIFIED TYPE: ICD-10-CM

## 2018-03-12 DIAGNOSIS — Z86.39 HISTORY OF VITAMIN D DEFICIENCY: ICD-10-CM

## 2018-03-12 DIAGNOSIS — M25.562 ACUTE PAIN OF LEFT KNEE: ICD-10-CM

## 2018-03-12 DIAGNOSIS — E78.5 DYSLIPIDEMIA: ICD-10-CM

## 2018-03-12 DIAGNOSIS — R73.01 IMPAIRED FASTING GLUCOSE: ICD-10-CM

## 2018-03-12 DIAGNOSIS — J45.50 SEVERE PERSISTENT ASTHMA WITHOUT COMPLICATION: ICD-10-CM

## 2018-03-12 LAB
HBA1C MFR BLD: 5.3 % (ref ?–5.8)
INT CON NEG: NEGATIVE
INT CON POS: POSITIVE

## 2018-03-12 PROCEDURE — 99214 OFFICE O/P EST MOD 30 MIN: CPT | Mod: GC | Performed by: INTERNAL MEDICINE

## 2018-03-12 PROCEDURE — 83036 HEMOGLOBIN GLYCOSYLATED A1C: CPT | Performed by: INTERNAL MEDICINE

## 2018-03-12 RX ORDER — GABAPENTIN 100 MG/1
100 CAPSULE ORAL 3 TIMES DAILY
Qty: 42 CAP | Refills: 0 | Status: SHIPPED | OUTPATIENT
Start: 2018-03-12 | End: 2018-03-26

## 2018-03-12 ASSESSMENT — PAIN SCALES - GENERAL: PAINLEVEL: 7=MODERATE-SEVERE PAIN

## 2018-03-12 NOTE — LETTER
March 12, 2018         Zander Domínguez  [address]  [place] [address]        Dear Zander:      Below are the results from your recent visit:    Resulted Orders   POCT Hemoglobin A1C   Result Value Ref Range    Glycohemoglobin 5.3 %      Comment:      LOT # [id number]  exp 09/2019    Internal Control Negative Negative     Internal Control Positive Positive      The test results show that normal.    If you have any questions or concerns, please don't hesitate to call.        Sincerely,      Nichole Wood M.D.    Electronically Signed

## 2018-03-12 NOTE — LETTER
March 13, 2018         Zander Domínguez  [address]  [place] [address]        Dear Zander:      Below are the results from your recent visit:    Resulted Orders   POCT Hemoglobin A1C   Result Value Ref Range    Glycohemoglobin 5.3 %      Comment:      LOT # [id number]  exp 09/2019    Internal Control Negative Negative     Internal Control Positive Positive      The test results show :  Normal.   If you have any questions or concerns, please don't hesitate to call.        Sincerely,      Nichole Wood M.D.    Electronically Signed

## 2018-03-12 NOTE — PATIENT INSTRUCTIONS
Knee Pain  Knee pain is a very common symptom and can have many causes. Knee pain often goes away when you follow your health care provider's instructions for relieving pain and discomfort at home. However, knee pain can develop into a condition that needs treatment. Some conditions may include:  · Arthritis caused by wear and tear (osteoarthritis).  · Arthritis caused by swelling and irritation (rheumatoid arthritis or gout).  · A cyst or growth in your knee.  · An infection in your knee joint.  · An injury that will not heal.  · Damage, swelling, or irritation of the tissues that support your knee (torn ligaments or tendinitis).  If your knee pain continues, additional tests may be ordered to diagnose your condition. Tests may include X-rays or other imaging studies of your knee. You may also need to have fluid removed from your knee. Treatment for ongoing knee pain depends on the cause, but treatment may include:  · Medicines to relieve pain or swelling.  · Steroid injections in your knee.  · Physical therapy.  · Surgery.  HOME CARE INSTRUCTIONS  · Take medicines only as directed by your health care provider.  · Rest your knee and keep it raised (elevated) while you are resting.  · Do not do things that cause or worsen pain.  · Avoid high-impact activities or exercises, such as running, jumping rope, or doing jumping jacks.  · Apply ice to the knee area:  ¨ Put ice in a plastic bag.  ¨ Place a towel between your skin and the bag.  ¨ Leave the ice on for 20 minutes, 2-3 times a day.  · Ask your health care provider if you should wear an elastic knee support.  · Keep a pillow under your knee when you sleep.  · Lose weight if you are overweight. Extra weight can put pressure on your knee.  · Do not use any tobacco products, including cigarettes, chewing tobacco, or electronic cigarettes. If you need help quitting, ask your health care provider. Smoking may slow the healing of any bone and joint problems that you may  have.  SEEK MEDICAL CARE IF:  · Your knee pain continues, changes, or gets worse.  · You have a fever along with knee pain.  · Your knee mango or locks up.  · Your knee becomes more swollen.  SEEK IMMEDIATE MEDICAL CARE IF:   · Your knee joint feels hot to the touch.  · You have chest pain or trouble breathing.  This information is not intended to replace advice given to you by your health care provider. Make sure you discuss any questions you have with your health care provider.  Document Released: 10/14/2008 Document Revised: 01/08/2016 Document Reviewed: 08/03/2015  Elsevier Interactive Patient Education © 2017 [employer]

## 2018-03-13 NOTE — PROGRESS NOTES
Established Patient    Zander presents today with the following:    CC: Left knee pain    HPI: 62 yr old male patient with PMHx of Asthma and HTN comes in for evaluation of left knee pain.    1. Left knee pain: states started about 2 weeks back. Started suddenly. Worsening with walking and movements of knee. Located back of left knee and radiating towards left hip and just below back of left knee.Severity 7/10 and sharp to dull in quality. No swelling, erythema, masses. Denies fever, chills, trauma. Denies any symptoms at left hip or other joints.    2. HTN : patient states he monitors blood pressure and readings have been systolic 135's and diastolics 85's. Denies related symptoms. Currently on lisinopril- HCTZ and amlodipine.    3. Severe asthma : patient followed up with pulmonology since last visit in the office. Recommendations- AnoroEllipta and Ventolin to use as needed for symptoms. Patient states his symptoms are under control with these two inhalers.    Otherwise denies any other complaints today.    Patient Active Problem List    Diagnosis Date Noted   • Obesity (BMI 30-39.9) 12/07/2017   • History of tobacco use 08/05/2016   • Severe persistent asthma without complication 05/03/2016   • Dyslipidemia 07/20/2015   • Back pain 05/25/2012   • HTN (hypertension), benign 03/16/2012       Current Outpatient Prescriptions   Medication Sig Dispense Refill   • gabapentin (NEURONTIN) 100 MG Cap Take 1 Cap by mouth 3 times a day for 14 days. 42 Cap 0   • atorvastatin (LIPITOR) 40 MG Tab Take 1 Tab by mouth every day. 30 Tab 6   • amlodipine (NORVASC) 10 MG Tab Take 1 Tab by mouth every day. 30 Tab 3   • MethylPREDNISolone (MEDROL DOSEPAK) 4 MG Tablet Therapy Pack As directed on the packaging label. 21 Tab 0   • tiotropium (SPIRIVA) 18 MCG Cap Inhale 1 Cap by mouth every day. 30 Cap 3   • lisinopril-hydrochlorothiazide (PRINZIDE, ZESTORETIC) 20-12.5 MG per tablet TAKE 1 TABLET BY MOUTH TWICE DAILY 180 Tab 1   •  "VENTOLIN  (90 BASE) MCG/ACT Aero Soln inhalation aerosol INHALE 2 PUFFS BY MOUTH EVERY 4 HOURS AS NEEDED FOR SHORTNESS OF BREATH 18 g 6   • ANORO ELLIPTA 62.5-25 MCG/INH AEROSOL POWDER, BREATH ACTIVATED INHALE 1 PUFF BY MOUTH EVERY DAY 1 Each 11     No current facility-administered medications for this visit.        Social History     Social History   • Marital status:      Spouse name: N/A   • Number of children: N/A   • Years of education: N/A     Occupational History   • Not on file.     Social History Main Topics   • Smoking status: Former Smoker     Packs/day: 1.00     Years: 30.00     Types: Cigarettes     Quit date: 1/1/2006   • Smokeless tobacco: Never Used   • Alcohol use 0.0 oz/week      Comment: rare   • Drug use: No   • Sexual activity: Yes     Partners: Male     Other Topics Concern   • Not on file     Social History Narrative   • No narrative on file       Family History   Problem Relation Age of Onset   • Hypertension Mother    • Diabetes Neg Hx    • Cancer Neg Hx    • Psychiatry Neg Hx    • Heart Disease Neg Hx        ROS: As per HPI. Additional pertinent symptoms as noted below.    Constitutional: Denies fever/chills/weight changes.   Eyes: Denies changes/pain in vision  ENT: Denies sore throat/congestion/ear ache.   Cardiovascular: Denies chest pain /palpitations/edema.   Respiratory: Denies SOB/cough/PND/orthopnea  Abdomen: Denies difficulty swallowing/ diarrhea/constipation/abdominal pain/nausea/vomiting  Genitourinary: Normal urinary habits.   Musculo-skeletal: Positive for left knee pain  Skin: Denies rash/lesions.  Neurological: Denies weakness/tingling/numbness/headache  Psychological: good mood and cooperative. Denies anxiety /depression       /84 Comment: recheck 5 minutes  Pulse 94   Temp 36.9 °C (98.5 °F)   Resp 19   Ht 1.803 m (5' 11\")   Wt 107.4 kg (236 lb 12.8 oz)   SpO2 92%   BMI 33.03 kg/m²     Physical Exam  General:  Alert and oriented, No apparent " distress.    Eyes: Pupils equal and reactive. No scleral icterus.    Throat: Clear no erythema or exudates noted.    Neck: Supple. No lymphadenopathy noted. Thyroid not enlarged.    Lungs: Decreased bilateral breath sounds  and normal percussion bilaterally.    Cardiovascular: Regular rate and rhythm. No murmurs, rubs or gallops.    Abdomen:  Benign. No rebound or guarding noted.    Extremities: No clubbing, cyanosis, edema.    Skin: Clear. No rash or suspicious skin lesions noted.    Neurological: Oriented to time, place, and person .Cranial nerves intact. No motor/sensory deficits.Reflexes were normal and symmetrical in both upper and lower extremities     Musculoskeletal : NROM of remaining extremities. Mild tenderness noted on palpation of hamstrings muscle on left side. Reproducible pain on internal rotation and flexion at left knee joint as wells as extension.     Note: I have reviewed all pertinent labs and diagnostic tests associated with this visit with specific comments listed under the assessment and plan below      Assessment and Plan    1. Acute pain of left knee  - multiple differentials- musculoskeletal or biceps femoris tendinopathy or hamstrings muscles strain/sprain or sciatic pain  - ordered referral to PT and advised to apply heat or cold compression both back of knee joint or across hamstrings muscles back of left thigh.   - advise to use tylenol as needed for symptomatic relief  - given more likely of sciatic pain- ordered gabapentin 100 mg PO once daily to use until seen in the next visit.  - will consider ordering MRI in the next visit if no improvement of symptoms.    2. Severe persistent asthma without complication  - symptoms are under control with as needed daily use of Anoro Ellipta and ventolin.  - follows up with pulmonologist    3. Dyslipidemia  - last lipid panel 12/12/17 shows Total Choles- 214, HDL- 75, Trigly- 38,    - currently on atorvastatin 40 mg PO once daily  - ASCVD  risk score 16.6% even being on statin medication  - advised to continue and in the last visit placed on aspirin but patient discontinued after side effects of GI upset.    4. Hypertension, unspecified type  - today reading  Is 146/84  - patient states his readings at home 135's/85's  - denies related symptoms  - currently on lisinopril- HCTZ 20-12.5 PO twice daily and amlodipine 10 mg PO once daily and advised to continue same regimen  - advise to maintain BP log and will follow up in next visit.  - ordered micro albumin creatinine ratio    5. Obesity (BMI 30-39.9)  - advised to eat healthy and exercise regularly  - last lipid panel as stated above.    6. History of vitamin D deficiency  - noted to have vitamin d level of 7 in 2012  - ordered repeat level    7. Impaired fasting glucose  - last labs done on 12/12/17 showed fasting glucose level of 110 and ordered HbA1c in the office which showed 5.3%  - denies related symptoms  - advise to eat healthy with DASH diet and exercise regularly.    Follow up: 2 weeks or early if symptoms does not improve or worsen or any acute issues    Signed by: Nichole Wood M.D.

## 2018-03-19 DIAGNOSIS — Z86.39 HISTORY OF VITAMIN D DEFICIENCY: ICD-10-CM

## 2018-03-19 DIAGNOSIS — I10 HYPERTENSION, UNSPECIFIED TYPE: ICD-10-CM

## 2018-03-21 RX ORDER — ALBUTEROL SULFATE 90 UG/1
2 AEROSOL, METERED RESPIRATORY (INHALATION) EVERY 4 HOURS PRN
Qty: 18 G | Refills: 3 | Status: SHIPPED | OUTPATIENT
Start: 2018-03-21 | End: 2018-08-17

## 2018-03-21 NOTE — TELEPHONE ENCOUNTER
Last seen: 03/12/18 by Dr. Wood  Next appt: 03/28/18 with Dr. Wood    Was the patient seen in the last year in this department? Yes   Does patient have an active prescription for medications requested? No   Received Request Via: Pharmacy

## 2018-03-28 ENCOUNTER — OFFICE VISIT (OUTPATIENT)
Dept: INTERNAL MEDICINE | Facility: MEDICAL CENTER | Age: 63
End: 2018-03-28
Payer: MEDICAID

## 2018-03-28 VITALS
TEMPERATURE: 99.4 F | BODY MASS INDEX: 33.4 KG/M2 | OXYGEN SATURATION: 90 % | SYSTOLIC BLOOD PRESSURE: 158 MMHG | WEIGHT: 238.6 LBS | DIASTOLIC BLOOD PRESSURE: 98 MMHG | HEIGHT: 71 IN | HEART RATE: 81 BPM

## 2018-03-28 DIAGNOSIS — M25.562 LEFT KNEE PAIN, UNSPECIFIED CHRONICITY: ICD-10-CM

## 2018-03-28 DIAGNOSIS — I10 ESSENTIAL HYPERTENSION: ICD-10-CM

## 2018-03-28 DIAGNOSIS — E66.9 OBESITY (BMI 30-39.9): ICD-10-CM

## 2018-03-28 DIAGNOSIS — E78.5 DYSLIPIDEMIA: ICD-10-CM

## 2018-03-28 PROCEDURE — 99213 OFFICE O/P EST LOW 20 MIN: CPT | Mod: GE | Performed by: INTERNAL MEDICINE

## 2018-03-28 ASSESSMENT — PAIN SCALES - GENERAL: PAINLEVEL: 3=SLIGHT PAIN

## 2018-03-28 NOTE — PROGRESS NOTES
Established Patient    Zander presents today with the following:    CC: f/u BP and review labs    HPI: 62 yr old male patient with PMHx of Asthma and HTN comes in for f/u of HTN,left knee pain and review labs ordered in the last visit.     1. Left knee pain: states started about 4 weeks back. Worsening with walking and movements of knee. Located back of left knee and radiating towards left hip and just below back of left knee. No swelling, erythema, masses. Denies fever, chills, trauma. Denies any symptoms at left hip or other joints. Was seen in the clinic on 3/12 for same problem and likely due to sciatica- ordered gabapentin in last visit which patient is still using and upcoming appointment for PT.Patient states his pain is under control with gabapentin, non pharmacological measure of hot or cold compressors and exercising.      2. HTN : patient states he monitors blood pressure and readings have been systolic 135's and diastolics 85's. Denies related symptoms. Currently on lisinopril- HCTZ and amlodipine. BP monitoring readings have always been high in the office and patient states home readings are normal. Ordered micro albumin creatinine ratio in the last visit and which came back normal.     3. Severe asthma : patient followed up with pulmonology in the past . Recommendations- AnoroEllipta and Ventolin to use as needed for symptoms. Patient states his symptoms are under control with these two inhalers.     4. Dyslipidemia: currently on atorvastatin 40 mg PO once daily and denies any related symptoms.    Otherwise denies any other complaints today.    Patient Active Problem List    Diagnosis Date Noted   • Obesity (BMI 30-39.9) 12/07/2017   • History of tobacco use 08/05/2016   • Severe persistent asthma without complication 05/03/2016   • Dyslipidemia 07/20/2015   • Back pain 05/25/2012   • HTN (hypertension), benign 03/16/2012       Current Outpatient Prescriptions   Medication Sig Dispense Refill   •  albuterol (VENTOLIN HFA) 108 (90 Base) MCG/ACT Aero Soln inhalation aerosol Inhale 2 Puffs by mouth every four hours as needed. FOR SHORTNESS OF BREATH 18 g 3   • atorvastatin (LIPITOR) 40 MG Tab Take 1 Tab by mouth every day. 30 Tab 6   • amlodipine (NORVASC) 10 MG Tab Take 1 Tab by mouth every day. 30 Tab 3   • lisinopril-hydrochlorothiazide (PRINZIDE, ZESTORETIC) 20-12.5 MG per tablet TAKE 1 TABLET BY MOUTH TWICE DAILY 180 Tab 1   • ANORO ELLIPTA 62.5-25 MCG/INH AEROSOL POWDER, BREATH ACTIVATED INHALE 1 PUFF BY MOUTH EVERY DAY 1 Each 11   • tiotropium (SPIRIVA) 18 MCG Cap Inhale 1 Cap by mouth every day. 30 Cap 3     No current facility-administered medications for this visit.        Social History     Social History   • Marital status:      Spouse name: N/A   • Number of children: N/A   • Years of education: N/A     Occupational History   • Not on file.     Social History Main Topics   • Smoking status: Former Smoker     Packs/day: 1.00     Years: 30.00     Types: Cigarettes     Quit date: 1/1/2006   • Smokeless tobacco: Never Used   • Alcohol use 0.0 oz/week      Comment: rare   • Drug use: No   • Sexual activity: Yes     Partners: Male     Other Topics Concern   • Not on file     Social History Narrative   • No narrative on file       Family History   Problem Relation Age of Onset   • Hypertension Mother    • Diabetes Neg Hx    • Cancer Neg Hx    • Psychiatry Neg Hx    • Heart Disease Neg Hx        ROS: As per HPI. Additional pertinent symptoms as noted below.  Constitutional: Denies fever/chills/weight changes.   Eyes: Denies changes/pain in vision  ENT: Denies sore throat/congestion/ear ache.   Cardiovascular: Denies chest pain /palpitations/edema.   Respiratory: Denies SOB/cough/PND/orthopnea  Abdomen: Denies difficulty swallowing/ diarrhea/constipation/abdominal pain/nausea/vomiting  Genitourinary: Normal urinary habits.   Musculo-skeletal: normal ambulation.positive for mild left knee pain which is  "improving  Skin: Denies rash/lesions.  Neurological: Denies weakness/tingling/numbness/headache  Psychological: good mood and cooperative. Denies anxiety /depression       /98   Pulse 81   Temp 37.4 °C (99.4 °F)   Ht 1.803 m (5' 11\")   Wt 108.2 kg (238 lb 9.6 oz)   SpO2 90%   BMI 33.28 kg/m²     Physical Exam  General:  Alert and oriented, No apparent distress.     Eyes: Pupils equal and reactive. No scleral icterus.     Throat: Clear no erythema or exudates noted.     Neck: Supple. No lymphadenopathy noted. Thyroid not enlarged.     Lungs: Decreased bilateral breath sounds  and normal percussion bilaterally.     Cardiovascular: Regular rate and rhythm. No murmurs, rubs or gallops.     Abdomen:  Benign. No rebound or guarding noted.     Extremities: No clubbing, cyanosis, edema.     Skin: Clear. No rash or suspicious skin lesions noted.     Neurological: Oriented to time, place, and person .Cranial nerves intact. No motor/sensory deficits.Reflexes were normal and symmetrical in both upper and lower extremities     Musculoskeletal : NROM of remaining extremities. No tenderness or deformity noted    Note: I have reviewed all pertinent labs and diagnostic tests associated with this visit with specific comments listed under the assessment and plan below      Assessment and Plan    1. Essential hypertension  - today reading  Is 158/98  - patient states his readings at home 135's/85's and every time he is seen in the physician offices readings have been high-likely white coat hypertension  - denies related symptoms and no end organ damage signs or symptoms  - currently on lisinopril- HCTZ 20-12.5 PO twice daily and amlodipine 10 mg PO once daily and advised to continue same regimen  - Patient has been maintaining blood pressure monitoring log at home and advised to bring the machine the next visit and will follow up in next visit in 10 days  - micro albumin creatinine ratio in the last visit - normal and " ordered repeat microalbumin creatinine ratio for the next visit in 10 days-advised to hydrate himself prior to testing.  -If the home readings are high or  readings in the next visit in the office are high we'll consider switching the medications to do chlorthalidone.    2. Left knee pain, unspecified chronicity  -Likely due to sciatic pain   -In the last visit ordered gabapentin which patient is taking currently with good symptomatic relief  -Ordered physical therapy referral in the last visit which is still pending  -Patient states his symptoms have improved with nonpharmacological methods-heat or cold pads as needed,exercise and pharmacological-Tylenol as needed and gabapentin    3. Obesity (BMI 30-39.9)  - advised to eat healthy-DASH diet limiting salt  - exercise regularly at least 30 minutes a day for 5 days a week    4. Dyslipidemia  - last lipid panel 12/12/17 shows Total Choles- 214, HDL- 75, Trigly- 38,    - currently on atorvastatin 40 mg PO once daily for primary prevention.  - advised to continue and in the past was out on aspirin but patient discontinued after side effects of GI upset.       Signed by: Nichole Wood M.D.

## 2018-03-28 NOTE — PATIENT INSTRUCTIONS
Hypertension  Hypertension, commonly called high blood pressure, is when the force of blood pumping through your arteries is too strong. Your arteries are the blood vessels that carry blood from your heart throughout your body. A blood pressure reading consists of a higher number over a lower number, such as 110/72. The higher number (systolic) is the pressure inside your arteries when your heart pumps. The lower number (diastolic) is the pressure inside your arteries when your heart relaxes. Ideally you want your blood pressure below 120/80.  Hypertension forces your heart to work harder to pump blood. Your arteries may become narrow or stiff. Having untreated or uncontrolled hypertension can cause heart attack, stroke, kidney disease, and other problems.  What increases the risk?  Some risk factors for high blood pressure are controllable. Others are not.  Risk factors you cannot control include:  · Race. You may be at higher risk if you are .  · Age. Risk increases with age.  · Gender. Men are at higher risk than women before age 45 years. After age 65, women are at higher risk than men.  Risk factors you can control include:  · Not getting enough exercise or physical activity.  · Being overweight.  · Getting too much fat, sugar, calories, or salt in your diet.  · Drinking too much alcohol.  What are the signs or symptoms?  Hypertension does not usually cause signs or symptoms. Extremely high blood pressure (hypertensive crisis) may cause headache, anxiety, shortness of breath, and nosebleed.  How is this diagnosed?  To check if you have hypertension, your health care provider will measure your blood pressure while you are seated, with your arm held at the level of your heart. It should be measured at least twice using the same arm. Certain conditions can cause a difference in blood pressure between your right and left arms. A blood pressure reading that is higher than normal on one occasion does  not mean that you need treatment. If it is not clear whether you have high blood pressure, you may be asked to return on a different day to have your blood pressure checked again. Or, you may be asked to monitor your blood pressure at home for 1 or more weeks.  How is this treated?  Treating high blood pressure includes making lifestyle changes and possibly taking medicine. Living a healthy lifestyle can help lower high blood pressure. You may need to change some of your habits.  Lifestyle changes may include:  · Following the DASH diet. This diet is high in fruits, vegetables, and whole grains. It is low in salt, red meat, and added sugars.  · Keep your sodium intake below 2,300 mg per day.  · Getting at least 30-45 minutes of aerobic exercise at least 4 times per week.  · Losing weight if necessary.  · Not smoking.  · Limiting alcoholic beverages.  · Learning ways to reduce stress.  Your health care provider may prescribe medicine if lifestyle changes are not enough to get your blood pressure under control, and if one of the following is true:  · You are 18-59 years of age and your systolic blood pressure is above 140.  · You are 60 years of age or older, and your systolic blood pressure is above 150.  · Your diastolic blood pressure is above 90.  · You have diabetes, and your systolic blood pressure is over 140 or your diastolic blood pressure is over 90.  · You have kidney disease and your blood pressure is above 140/90.  · You have heart disease and your blood pressure is above 140/90.  Your personal target blood pressure may vary depending on your medical conditions, your age, and other factors.  Follow these instructions at home:  · Have your blood pressure rechecked as directed by your health care provider.  · Take medicines only as directed by your health care provider. Follow the directions carefully. Blood pressure medicines must be taken as prescribed. The medicine does not work as well when you skip  doses. Skipping doses also puts you at risk for problems.  · Do not smoke.  · Monitor your blood pressure at home as directed by your health care provider.  Contact a health care provider if:  · You think you are having a reaction to medicines taken.  · You have recurrent headaches or feel dizzy.  · You have swelling in your ankles.  · You have trouble with your vision.  Get help right away if:  · You develop a severe headache or confusion.  · You have unusual weakness, numbness, or feel faint.  · You have severe chest or abdominal pain.  · You vomit repeatedly.  · You have trouble breathing.  This information is not intended to replace advice given to you by your health care provider. Make sure you discuss any questions you have with your health care provider.  Document Released: 12/18/2006 Document Revised: 05/25/2017 Document Reviewed: 10/10/2014  [employer] Interactive Patient Education © 2017 [employer]

## 2018-04-16 RX ORDER — AMLODIPINE BESYLATE 10 MG/1
10 TABLET ORAL DAILY
Qty: 90 TAB | Refills: 0 | Status: SHIPPED | OUTPATIENT
Start: 2018-04-16 | End: 2018-08-17

## 2018-04-16 NOTE — TELEPHONE ENCOUNTER
Last seen: 03/28/18 by Dr. Wood  Next appt: None     Was the patient seen in the last year in this department? Yes   Does patient have an active prescription for medications requested? No   Received Request Via: Pharmacy

## 2018-04-20 ENCOUNTER — TELEPHONE (OUTPATIENT)
Dept: INTERNAL MEDICINE | Facility: MEDICAL CENTER | Age: 63
End: 2018-04-20

## 2018-04-20 NOTE — TELEPHONE ENCOUNTER
Pt called and left me a VM stating that he would like to get a refill for his lisinopril called into the pharmacy [facility] on [address]. I called pharmacy to make sure pt had refill and spoke to pharmacist Myrna she stated that Rx was sent 4/19/18 at 1717 new Rx to follow it got denied, pharmacist states that pt needs PA for this medication she faxed it over to us [phone] on 4/20/18.

## 2018-05-09 ENCOUNTER — TELEPHONE (OUTPATIENT)
Dept: INTERNAL MEDICINE | Facility: MEDICAL CENTER | Age: 63
End: 2018-05-09

## 2018-05-17 NOTE — TELEPHONE ENCOUNTER
Called and spoke to pt advised him of what was going on with medication he seemed okay with it and went ahead and scheduled an appointment with the  to be further evaluated to keep Gabapentin going for him.

## 2018-08-16 ENCOUNTER — NON-PROVIDER VISIT (OUTPATIENT)
Dept: OCCUPATIONAL MEDICINE | Facility: CLINIC | Age: 63
End: 2018-08-16

## 2018-08-16 DIAGNOSIS — Z02.89 ENCOUNTER FOR OCCUPATIONAL HEALTH ASSESSMENT: ICD-10-CM

## 2018-08-17 ENCOUNTER — HOSPITAL ENCOUNTER (INPATIENT)
Facility: MEDICAL CENTER | Age: 63
LOS: 1 days | DRG: 189 | End: 2018-08-20
Attending: EMERGENCY MEDICINE | Admitting: INTERNAL MEDICINE

## 2018-08-17 ENCOUNTER — OFFICE VISIT (OUTPATIENT)
Dept: OCCUPATIONAL MEDICINE | Facility: CLINIC | Age: 63
End: 2018-08-17

## 2018-08-17 ENCOUNTER — APPOINTMENT (OUTPATIENT)
Dept: RADIOLOGY | Facility: MEDICAL CENTER | Age: 63
DRG: 189 | End: 2018-08-17
Attending: EMERGENCY MEDICINE

## 2018-08-17 ENCOUNTER — NON-PROVIDER VISIT (OUTPATIENT)
Dept: OCCUPATIONAL MEDICINE | Facility: CLINIC | Age: 63
End: 2018-08-17

## 2018-08-17 ENCOUNTER — APPOINTMENT (OUTPATIENT)
Dept: URGENT CARE | Facility: CLINIC | Age: 63
End: 2018-08-17

## 2018-08-17 ENCOUNTER — OFFICE VISIT (OUTPATIENT)
Dept: URGENT CARE | Facility: CLINIC | Age: 63
End: 2018-08-17

## 2018-08-17 VITALS
DIASTOLIC BLOOD PRESSURE: 120 MMHG | HEIGHT: 70 IN | OXYGEN SATURATION: 89 % | RESPIRATION RATE: 20 BRPM | SYSTOLIC BLOOD PRESSURE: 226 MMHG | HEART RATE: 105 BPM | BODY MASS INDEX: 32.93 KG/M2 | TEMPERATURE: 98.7 F | WEIGHT: 230 LBS

## 2018-08-17 VITALS
TEMPERATURE: 98.1 F | RESPIRATION RATE: 16 BRPM | BODY MASS INDEX: 33.64 KG/M2 | OXYGEN SATURATION: 96 % | HEIGHT: 70 IN | HEART RATE: 90 BPM | WEIGHT: 235 LBS

## 2018-08-17 DIAGNOSIS — I10 HTN (HYPERTENSION), BENIGN: Chronic | ICD-10-CM

## 2018-08-17 DIAGNOSIS — J44.9 CHRONIC OBSTRUCTIVE PULMONARY DISEASE, UNSPECIFIED COPD TYPE (HCC): Primary | ICD-10-CM

## 2018-08-17 DIAGNOSIS — Z02.89 ENCOUNTER FOR OCCUPATIONAL HEALTH ASSESSMENT: ICD-10-CM

## 2018-08-17 DIAGNOSIS — I10 MALIGNANT HYPERTENSION: Primary | ICD-10-CM

## 2018-08-17 DIAGNOSIS — J96.01 ACUTE RESPIRATORY FAILURE WITH HYPOXIA (HCC): ICD-10-CM

## 2018-08-17 DIAGNOSIS — I16.0 HYPERTENSIVE URGENCY: ICD-10-CM

## 2018-08-17 DIAGNOSIS — Z91.148 NONCOMPLIANCE WITH ANTIRETROVIRAL MEDICATION REGIMEN: ICD-10-CM

## 2018-08-17 DIAGNOSIS — I15.8 OTHER SECONDARY HYPERTENSION: ICD-10-CM

## 2018-08-17 DIAGNOSIS — Z76.0 MEDICATION REFILL: ICD-10-CM

## 2018-08-17 LAB
ANION GAP SERPL CALC-SCNC: 13 MMOL/L (ref 0–11.9)
APTT PPP: 31.3 SEC (ref 24.7–36)
BASOPHILS # BLD AUTO: 0.5 % (ref 0–1.8)
BASOPHILS # BLD: 0.02 K/UL (ref 0–0.12)
BNP SERPL-MCNC: 128 PG/ML (ref 0–100)
BUN SERPL-MCNC: 9 MG/DL (ref 8–22)
CALCIUM SERPL-MCNC: 9.3 MG/DL (ref 8.5–10.5)
CHLORIDE SERPL-SCNC: 105 MMOL/L (ref 96–112)
CO2 SERPL-SCNC: 22 MMOL/L (ref 20–33)
CREAT SERPL-MCNC: 0.94 MG/DL (ref 0.5–1.4)
EKG IMPRESSION: NORMAL
EOSINOPHIL # BLD AUTO: 0.24 K/UL (ref 0–0.51)
EOSINOPHIL NFR BLD: 5.9 % (ref 0–6.9)
ERYTHROCYTE [DISTWIDTH] IN BLOOD BY AUTOMATED COUNT: 37.9 FL (ref 35.9–50)
GLUCOSE SERPL-MCNC: 99 MG/DL (ref 65–99)
HCT VFR BLD AUTO: 48.5 % (ref 42–52)
HGB BLD-MCNC: 17.8 G/DL (ref 14–18)
IMM GRANULOCYTES # BLD AUTO: 0 K/UL (ref 0–0.11)
IMM GRANULOCYTES NFR BLD AUTO: 0 % (ref 0–0.9)
INR PPP: 1.09 (ref 0.87–1.13)
LYMPHOCYTES # BLD AUTO: 1.85 K/UL (ref 1–4.8)
LYMPHOCYTES NFR BLD: 45.5 % (ref 22–41)
MCH RBC QN AUTO: 34 PG (ref 27–33)
MCHC RBC AUTO-ENTMCNC: 36.7 G/DL (ref 33.7–35.3)
MCV RBC AUTO: 92.7 FL (ref 81.4–97.8)
MONOCYTES # BLD AUTO: 0.52 K/UL (ref 0–0.85)
MONOCYTES NFR BLD AUTO: 12.8 % (ref 0–13.4)
NEUTROPHILS # BLD AUTO: 1.44 K/UL (ref 1.82–7.42)
NEUTROPHILS NFR BLD: 35.3 % (ref 44–72)
NRBC # BLD AUTO: 0 K/UL
NRBC BLD-RTO: 0 /100 WBC
PLATELET # BLD AUTO: 206 K/UL (ref 164–446)
PMV BLD AUTO: 10.5 FL (ref 9–12.9)
POTASSIUM SERPL-SCNC: 3.9 MMOL/L (ref 3.6–5.5)
PROTHROMBIN TIME: 13.8 SEC (ref 12–14.6)
RBC # BLD AUTO: 5.23 M/UL (ref 4.7–6.1)
SODIUM SERPL-SCNC: 140 MMOL/L (ref 135–145)
TROPONIN I SERPL-MCNC: 0.03 NG/ML (ref 0–0.04)
WBC # BLD AUTO: 4.1 K/UL (ref 4.8–10.8)

## 2018-08-17 PROCEDURE — 700101 HCHG RX REV CODE 250: Performed by: HOSPITALIST

## 2018-08-17 PROCEDURE — 99220 PR INITIAL OBSERVATION CARE,LEVL III: CPT | Performed by: HOSPITALIST

## 2018-08-17 PROCEDURE — 700101 HCHG RX REV CODE 250: Performed by: EMERGENCY MEDICINE

## 2018-08-17 PROCEDURE — 80048 BASIC METABOLIC PNL TOTAL CA: CPT

## 2018-08-17 PROCEDURE — 7100 PR DOT PHYSICAL: Performed by: PREVENTIVE MEDICINE

## 2018-08-17 PROCEDURE — 93005 ELECTROCARDIOGRAM TRACING: CPT | Performed by: EMERGENCY MEDICINE

## 2018-08-17 PROCEDURE — 71045 X-RAY EXAM CHEST 1 VIEW: CPT

## 2018-08-17 PROCEDURE — 94640 AIRWAY INHALATION TREATMENT: CPT

## 2018-08-17 PROCEDURE — G0378 HOSPITAL OBSERVATION PER HR: HCPCS

## 2018-08-17 PROCEDURE — 84484 ASSAY OF TROPONIN QUANT: CPT

## 2018-08-17 PROCEDURE — 700102 HCHG RX REV CODE 250 W/ 637 OVERRIDE(OP): Performed by: EMERGENCY MEDICINE

## 2018-08-17 PROCEDURE — 304561 HCHG STAT O2

## 2018-08-17 PROCEDURE — 85025 COMPLETE CBC W/AUTO DIFF WBC: CPT

## 2018-08-17 PROCEDURE — 99285 EMERGENCY DEPT VISIT HI MDM: CPT

## 2018-08-17 PROCEDURE — 36415 COLL VENOUS BLD VENIPUNCTURE: CPT

## 2018-08-17 PROCEDURE — 99205 OFFICE O/P NEW HI 60 MIN: CPT | Performed by: PHYSICIAN ASSISTANT

## 2018-08-17 PROCEDURE — 85730 THROMBOPLASTIN TIME PARTIAL: CPT

## 2018-08-17 PROCEDURE — A9270 NON-COVERED ITEM OR SERVICE: HCPCS | Performed by: EMERGENCY MEDICINE

## 2018-08-17 PROCEDURE — 700102 HCHG RX REV CODE 250 W/ 637 OVERRIDE(OP): Performed by: HOSPITALIST

## 2018-08-17 PROCEDURE — A9270 NON-COVERED ITEM OR SERVICE: HCPCS | Performed by: HOSPITALIST

## 2018-08-17 PROCEDURE — 83880 ASSAY OF NATRIURETIC PEPTIDE: CPT

## 2018-08-17 PROCEDURE — 8907 PR URINE COLLECT ONLY: Performed by: PREVENTIVE MEDICINE

## 2018-08-17 PROCEDURE — 85610 PROTHROMBIN TIME: CPT

## 2018-08-17 RX ORDER — LABETALOL HYDROCHLORIDE 5 MG/ML
10 INJECTION, SOLUTION INTRAVENOUS EVERY 4 HOURS PRN
Status: DISCONTINUED | OUTPATIENT
Start: 2018-08-17 | End: 2018-08-20 | Stop reason: HOSPADM

## 2018-08-17 RX ORDER — ACETAMINOPHEN 325 MG/1
650 TABLET ORAL EVERY 6 HOURS PRN
Status: DISCONTINUED | OUTPATIENT
Start: 2018-08-17 | End: 2018-08-20 | Stop reason: HOSPADM

## 2018-08-17 RX ORDER — HYDRALAZINE HYDROCHLORIDE 20 MG/ML
10 INJECTION INTRAMUSCULAR; INTRAVENOUS EVERY 6 HOURS PRN
Status: DISCONTINUED | OUTPATIENT
Start: 2018-08-17 | End: 2018-08-20 | Stop reason: HOSPADM

## 2018-08-17 RX ORDER — BISACODYL 10 MG
10 SUPPOSITORY, RECTAL RECTAL
Status: DISCONTINUED | OUTPATIENT
Start: 2018-08-17 | End: 2018-08-20 | Stop reason: HOSPADM

## 2018-08-17 RX ORDER — LISINOPRIL 20 MG/1
20 TABLET ORAL ONCE
Status: COMPLETED | OUTPATIENT
Start: 2018-08-17 | End: 2018-08-17

## 2018-08-17 RX ORDER — AMLODIPINE BESYLATE 5 MG/1
5 TABLET ORAL
Status: DISCONTINUED | OUTPATIENT
Start: 2018-08-18 | End: 2018-08-17

## 2018-08-17 RX ORDER — ONDANSETRON 4 MG/1
4 TABLET, ORALLY DISINTEGRATING ORAL EVERY 4 HOURS PRN
Status: DISCONTINUED | OUTPATIENT
Start: 2018-08-17 | End: 2018-08-20 | Stop reason: HOSPADM

## 2018-08-17 RX ORDER — HYDROCHLOROTHIAZIDE 12.5 MG/1
12.5 TABLET ORAL ONCE
Status: COMPLETED | OUTPATIENT
Start: 2018-08-17 | End: 2018-08-17

## 2018-08-17 RX ORDER — PROMETHAZINE HYDROCHLORIDE 25 MG/1
12.5-25 TABLET ORAL EVERY 4 HOURS PRN
Status: DISCONTINUED | OUTPATIENT
Start: 2018-08-17 | End: 2018-08-20 | Stop reason: HOSPADM

## 2018-08-17 RX ORDER — PROMETHAZINE HYDROCHLORIDE 25 MG/1
12.5-25 SUPPOSITORY RECTAL EVERY 4 HOURS PRN
Status: DISCONTINUED | OUTPATIENT
Start: 2018-08-17 | End: 2018-08-20 | Stop reason: HOSPADM

## 2018-08-17 RX ORDER — AMLODIPINE BESYLATE 5 MG/1
5 TABLET ORAL
Status: DISCONTINUED | OUTPATIENT
Start: 2018-08-17 | End: 2018-08-17

## 2018-08-17 RX ORDER — AMLODIPINE BESYLATE 5 MG/1
10 TABLET ORAL ONCE
Status: COMPLETED | OUTPATIENT
Start: 2018-08-17 | End: 2018-08-17

## 2018-08-17 RX ORDER — TIOTROPIUM BROMIDE 18 UG/1
18 CAPSULE ORAL; RESPIRATORY (INHALATION) DAILY
Qty: 30 CAP | Refills: 3 | Status: SHIPPED | OUTPATIENT
Start: 2018-08-17

## 2018-08-17 RX ORDER — ATORVASTATIN CALCIUM 40 MG/1
40 TABLET, FILM COATED ORAL DAILY
Qty: 30 TAB | Refills: 6 | Status: SHIPPED | OUTPATIENT
Start: 2018-08-17 | End: 2018-08-20

## 2018-08-17 RX ORDER — AMOXICILLIN 250 MG
2 CAPSULE ORAL 2 TIMES DAILY
Status: DISCONTINUED | OUTPATIENT
Start: 2018-08-17 | End: 2018-08-20 | Stop reason: HOSPADM

## 2018-08-17 RX ORDER — ALBUTEROL SULFATE 90 UG/1
2 AEROSOL, METERED RESPIRATORY (INHALATION) EVERY 6 HOURS PRN
Status: ON HOLD | COMMUNITY
End: 2018-08-18

## 2018-08-17 RX ORDER — ONDANSETRON 2 MG/ML
4 INJECTION INTRAMUSCULAR; INTRAVENOUS EVERY 4 HOURS PRN
Status: DISCONTINUED | OUTPATIENT
Start: 2018-08-17 | End: 2018-08-20 | Stop reason: HOSPADM

## 2018-08-17 RX ORDER — AMLODIPINE BESYLATE 10 MG/1
10 TABLET ORAL DAILY
Qty: 90 TAB | Refills: 0 | Status: SHIPPED | OUTPATIENT
Start: 2018-08-17

## 2018-08-17 RX ORDER — ALBUTEROL SULFATE 90 UG/1
2 AEROSOL, METERED RESPIRATORY (INHALATION) EVERY 6 HOURS PRN
Status: DISCONTINUED | OUTPATIENT
Start: 2018-08-17 | End: 2018-08-20 | Stop reason: HOSPADM

## 2018-08-17 RX ORDER — POLYETHYLENE GLYCOL 3350 17 G/17G
1 POWDER, FOR SOLUTION ORAL
Status: DISCONTINUED | OUTPATIENT
Start: 2018-08-17 | End: 2018-08-20 | Stop reason: HOSPADM

## 2018-08-17 RX ORDER — LISINOPRIL AND HYDROCHLOROTHIAZIDE 20; 12.5 MG/1; MG/1
TABLET ORAL
Qty: 60 TAB | Refills: 0 | Status: SHIPPED | OUTPATIENT
Start: 2018-08-17

## 2018-08-17 RX ORDER — AMLODIPINE BESYLATE 5 MG/1
5 TABLET ORAL
Status: DISCONTINUED | OUTPATIENT
Start: 2018-08-17 | End: 2018-08-18

## 2018-08-17 RX ORDER — ALBUTEROL SULFATE 90 UG/1
2 AEROSOL, METERED RESPIRATORY (INHALATION) EVERY 4 HOURS PRN
Qty: 18 G | Refills: 3 | Status: SHIPPED | OUTPATIENT
Start: 2018-08-17 | End: 2018-10-02

## 2018-08-17 RX ADMIN — HYDROCHLOROTHIAZIDE 12.5 MG: 12.5 TABLET ORAL at 14:09

## 2018-08-17 RX ADMIN — AMLODIPINE BESYLATE 10 MG: 5 TABLET ORAL at 14:09

## 2018-08-17 RX ADMIN — AMLODIPINE BESYLATE 5 MG: 5 TABLET ORAL at 18:39

## 2018-08-17 RX ADMIN — LABETALOL HYDROCHLORIDE 10 MG: 5 INJECTION, SOLUTION INTRAVENOUS at 17:46

## 2018-08-17 RX ADMIN — LISINOPRIL 20 MG: 20 TABLET ORAL at 14:09

## 2018-08-17 RX ADMIN — ALBUTEROL SULFATE 2.5 MG: 2.5 SOLUTION RESPIRATORY (INHALATION) at 14:31

## 2018-08-17 ASSESSMENT — COGNITIVE AND FUNCTIONAL STATUS - GENERAL
SUGGESTED CMS G CODE MODIFIER MOBILITY: CH
MOBILITY SCORE: 24
SUGGESTED CMS G CODE MODIFIER DAILY ACTIVITY: CH
DAILY ACTIVITIY SCORE: 24

## 2018-08-17 ASSESSMENT — PAIN SCALES - GENERAL
PAINLEVEL_OUTOF10: 0

## 2018-08-17 ASSESSMENT — PAIN SCALES - WONG BAKER
WONGBAKER_NUMERICALRESPONSE: DOESN'T HURT AT ALL
WONGBAKER_NUMERICALRESPONSE: DOESN'T HURT AT ALL

## 2018-08-17 ASSESSMENT — PATIENT HEALTH QUESTIONNAIRE - PHQ9
SUM OF ALL RESPONSES TO PHQ9 QUESTIONS 1 AND 2: 0
1. LITTLE INTEREST OR PLEASURE IN DOING THINGS: NOT AT ALL
SUM OF ALL RESPONSES TO PHQ9 QUESTIONS 1 AND 2: 0
2. FEELING DOWN, DEPRESSED, IRRITABLE, OR HOPELESS: NOT AT ALL
1. LITTLE INTEREST OR PLEASURE IN DOING THINGS: NOT AT ALL
2. FEELING DOWN, DEPRESSED, IRRITABLE, OR HOPELESS: NOT AT ALL

## 2018-08-17 ASSESSMENT — COPD QUESTIONNAIRES
COPD SCREENING SCORE: 5
DO YOU EVER COUGH UP ANY MUCUS OR PHLEGM?: NO/ONLY WITH OCCASIONAL COLDS OR INFECTIONS
DURING THE PAST 4 WEEKS HOW MUCH DID YOU FEEL SHORT OF BREATH: SOME OF THE TIME
HAVE YOU SMOKED AT LEAST 100 CIGARETTES IN YOUR ENTIRE LIFE: YES

## 2018-08-17 ASSESSMENT — LIFESTYLE VARIABLES
EVER_SMOKED: YES
EVER_SMOKED: YES
ALCOHOL_USE: NO

## 2018-08-17 NOTE — ED PROVIDER NOTES
ED Provider Note    Scribed for Guero Pratt M.D. by Efren Amaya. 8/17/2018  1:54 PM    Primary care provider: Nichole Wood M.D.  Means of arrival: Walk-in  History obtained from: Patient  History limited by: None    CHIEF COMPLAINT  Chief Complaint   Patient presents with   • Hypertension   • Medication Refill     ran out of htn medication        HPI  Zander Domínguez is a 63 y.o. male with a history of hypertension who presents to the Emergency Department for evaluation of hypertension. 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 He was seen at [facility], found to have systolic blood pressure in 200's, and was advised to come here for evaluation. He denies any headache or other symptoms. He reports feeling well overall. He normally takes blood pressure medications, but ran out of them last night. Upon arrival, the patient was satting 86% on room air. He was placed on 2L oxygen, but denies any shortness of breath, cough, or chest pain.     REVIEW OF SYSTEMS  Pertinent positives include hypertension.   Pertinent negatives include no headache, shortness of breath, cough, or chest pain.    All other systems reviewed and negative. See HPI for further details. C.      PAST MEDICAL HISTORY   has a past medical history of HTN (hypertension), benign (3/16/2012).    SURGICAL HISTORY  patient denies any surgical history    SOCIAL HISTORY  Social History   Substance Use Topics   • Smoking status: Former Smoker     Packs/day: 1.00     Years: 30.00     Types: Cigarettes     Quit date: 1/1/2006   • Smokeless tobacco: Never Used   • Alcohol use 0.0 oz/week      Comment: rare      History   Drug Use No       FAMILY HISTORY  Family History   Problem Relation Age of Onset   • Hypertension Mother    • Diabetes Neg Hx    • Cancer Neg Hx    • Psychiatry Neg Hx    • Heart Disease Neg Hx        CURRENT MEDICATIONS  Home Medications     Reviewed by  "Miki Piña R.N. (Registered Nurse) on 08/17/18 at 1327  Med List Status: <None>   Medication Last Dose Status   albuterol (VENTOLIN HFA) 108 (90 Base) MCG/ACT Aero Soln inhalation aerosol 3/28/2018 Active   amLODIPine (NORVASC) 10 MG Tab 8/17/2018 Active   ANORO ELLIPTA 62.5-25 MCG/INH AEROSOL POWDER, BREATH ACTIVATED  Active   atorvastatin (LIPITOR) 40 MG Tab 3/28/2018 Active   lisinopril-hydrochlorothiazide (PRINZIDE, ZESTORETIC) 20-12.5 MG per tablet 8/17/2018 Active   tiotropium (SPIRIVA) 18 MCG Cap > Month Active                ALLERGIES  No Known Allergies    PHYSICAL EXAM  VITAL SIGNS: BP (!) 206/136   Pulse 93   Temp 36.4 °C (97.5 °F)   Resp 18   Ht 1.778 m (5' 10\")   Wt 105 kg (231 lb 7.7 oz)   SpO2 94%   BMI 33.21 kg/m²     Nursing note and vitals reviewed.  Constitutional: Well-developed and well-nourished. No distress.   HENT: Head is normocephalic and atraumatic. Oropharynx is clear and moist without exudate or erythema.   Eyes: Pupils are equal, round, and reactive to light. Conjunctiva are normal.   Cardiovascular: Normal rate and regular rhythm. No murmur heard. Normal radial pulses.   Pulmonary/Chest: Breath sounds normal. No wheezes or rales. No chest wall tenderness.   Abdominal: Soft and non-tender. No distention   Musculoskeletal: Extremities exhibit normal range of motion without edema or tenderness. No calf tenderness or palpable cords.   Neurological: Awake, alert and oriented to person, place, and time. No focal deficits noted.  Skin: Skin is warm and dry. No rash.   Psychiatric: Normal mood and affect. Appropriate for clinical situation    DIAGNOSTIC STUDIES / PROCEDURES    EKG Interpretation  12 Lead EKG obtained and interpreted by me as shown below.     LABS  Results for orders placed or performed during the hospital encounter of 08/17/18   CBC w/ Differential   Result Value Ref Range    WBC 4.1 (L) 4.8 - 10.8 K/uL    RBC 5.23 4.70 - 6.10 M/uL    Hemoglobin 17.8 14.0 - 18.0 " g/dL    Hematocrit 48.5 42.0 - 52.0 %    MCV 92.7 81.4 - 97.8 fL    MCH 34.0 (H) 27.0 - 33.0 pg    MCHC 36.7 (H) 33.7 - 35.3 g/dL    RDW 37.9 35.9 - 50.0 fL    Platelet Count 206 164 - 446 K/uL    MPV 10.5 9.0 - 12.9 fL    Neutrophils-Polys 35.30 (L) 44.00 - 72.00 %    Lymphocytes 45.50 (H) 22.00 - 41.00 %    Monocytes 12.80 0.00 - 13.40 %    Eosinophils 5.90 0.00 - 6.90 %    Basophils 0.50 0.00 - 1.80 %    Immature Granulocytes 0.00 0.00 - 0.90 %    Nucleated RBC 0.00 /100 WBC    Neutrophils (Absolute) 1.44 (L) 1.82 - 7.42 K/uL    Lymphs (Absolute) 1.85 1.00 - 4.80 K/uL    Monos (Absolute) 0.52 0.00 - 0.85 K/uL    Eos (Absolute) 0.24 0.00 - 0.51 K/uL    Baso (Absolute) 0.02 0.00 - 0.12 K/uL    Immature Granulocytes (abs) 0.00 0.00 - 0.11 K/uL    NRBC (Absolute) 0.00 K/uL   PT/INR   Result Value Ref Range    PT 13.8 12.0 - 14.6 sec    INR 1.09 0.87 - 1.13   APTT   Result Value Ref Range    APTT 31.3 24.7 - 36.0 sec   EKG - STAT   Result Value Ref Range    Report       [facility] Emergency Dept[facility]    Test Date:  2018  Pt Name:    WALTER PURDY                Department: ER  MRN:        [id number]                      Room:        10  Gender:     Male                         Technician: [id number]  :        1955                   Requested By:MIKALA MEJIA  Order #:    [id number]                    Reading MD: MIKALA MEJIA MD    Measurements  Intervals                                Axis  Rate:       89                           P:          71  NH:         144                          QRS:        75  QRSD:       74                           T:          40  QT:         404  QTc:        492    Interpretive Statements  SINUS RHYTHM  PROBABLE LEFT ATRIAL ABNORMALITY  BORDERLINE LOW VOLTAGE IN FRONTAL LEADS  BORDERLINE R WAVE PROGRESSION, ANTERIOR LEADS  BORDERLINE PROLONGED QT INTERVAL  No previous ECG available for comparison    Electronically Signed On 2018 14:29:16 PDT by MIKALA ORDONEZ  MD ROBERTO     All labs reviewed by me.    RADIOLOGY  DX-CHEST-PORTABLE (1 VIEW)   Final Result      No acute cardiopulmonary abnormality identified.      The radiologist's interpretation of all radiological studies have been reviewed by me.    COURSE & MEDICAL DECISION MAKING  Nursing notes, VS, PMSFHx reviewed in chart.     Review of past medical records shows the patient has no prior ER visits. He follows up routinely with Primary Care.      1:54 PM - Patient seen and examined at bedside. Patient will be treated with Lisinopril 20mg, Hydrodiuril 12.5mg, Norvasc 10mg, and Albuterol. Ordered DX-chest, CBC, BMP, BNP, troponin, PT/INR, APTT, and EKG. to evaluate his symptoms. The differential diagnoses include but are not limited to: hypertensive urgency vs emergency, pulmonary edema, medication non-compliance. Discussed with the patient that he will likely be admitted to the Hospitalist for further medical treatment and care. He understands and agrees to plan.     Patient presents today with significantly elevated blood pressure.  He is given a dose of his home blood pressure medications to see if this improves his blood pressure.  The patient is also hypoxic.  He does not have any significant wheezing on exam but he does have a history of COPD based on his medication list.  Does not use home oxygen.  I will give the patient a breathing treatment to see if this improves his hypoxia.    3:35 PM the patient's blood pressure improved following treatment with his home blood pressure medications.  Chest x-ray was unrevealing.  Hypoxia persists despite treatment with breathing treatment.  BNP is mildly elevated but not consistent with an acute CHF exacerbation.  Due to his hypoxia and elevated blood pressure will be admitted to the hospital for further evaluation and treatment.  I spoke with the on-call hospitalist Dr. Ahuja for admission.    DISPOSITION:  Patient will be admitted to Dr. Ahuja in stable condition.      CRITICAL CARE  I provided critical care services, which included medication orders, frequent reevaluations of the patient's condition and response to treatment, ordering and reviewing test results, and discussing the case with various consultants.  The critical care time associated with the care of the patient was 35 minutes. Review chart for interventions. This time is exclusive of any other billable procedures.        FINAL IMPRESSION  1. HTN (hypertension), benign    2. Other secondary hypertension    3. Chronic obstructive pulmonary disease, unspecified COPD type (HCC)    4. Medication refill    5. Hypertensive urgency    6. Acute respiratory failure with hypoxia (HCC)    7. Noncompliance with antiretroviral medication regimen          IEfren (Rosas), am scribing for, and in the presence of, Guero Pratt M.D..    Electronically signed by: Efren Amaya (Rosas), 8/17/2018    IGuero M.D. personally performed the services described in this documentation, as scribed by Efren Amaya in my presence, and it is both accurate and complete.    The note accurately reflects work and decisions made by me.  Guero Pratt  8/17/2018  3:36 PM

## 2018-08-17 NOTE — PROGRESS NOTES
Patient presented for a DOT examination.  Blood pressure was found to be 210/134.  Patient was placed in supine position and resting comfortably for approximately 10 minutes and repeat blood pressure was 200/125.  Patient was advised that he needed to seek care immediately for malignant hypertension and was taken to the urgent care.  Patient denied any dizziness, nausea, vomiting, hematuria, hematochezia, melena.  Patient denied history of MI, stroke.  He does have history of hyperlipidemia and hypertension.  He is currently on amlodipine and lisinopril/HCTZ.     See scanned documents for DOT examination form for physical exam and other documentation.  Patient was not qualified, but advised to return to clinic with his blood pressures under control for another examination.

## 2018-08-17 NOTE — ED TRIAGE NOTES
Pt to rm 10 .  Chief Complaint   Patient presents with   • Hypertension   • Medication Refill     ran out of htn medication    pt 86% while off oxygen.denies sob or recent cough

## 2018-08-17 NOTE — H&P
Hospital Medicine History & Physical Note    Date of Service  8/17/2018    Primary Care Physician  Nichole Wood M.D.    Consultants  None    Code Status  DNR/DNI    Chief Complaint  Sent from [employer] physical to ER for elevated blood pressure.    History of Presenting Illness  63 y.o. male who presented 8/17/2018 with hypertension. He was having a physical exam to work for the [employer] when he was noted to have a BP of 210/134. He has known history of HTN (previously on lisinopril-HCTZ 20-12.5 mg twice daily, amlodipine 10 mg daily) but had been out of his medications for roughly 2 months secondary to insurance issues. He had lost his medicaid coverage and has been having   difficulty finding health insurance because of his pre-existing conditions.    He was sent to [facility] where BP was found to be elevated and advised to go to the ED. Patient denies headache, dizziness, changes in vision, N/V, CP, SOB, abd pain, hematuria/hematochezia.  Denies recent illnesses. In the ED, patient afebrile, P 93, R 18, /136, saturating 94% on 2LNC.  Troponin 0.03.  .  EKG showed sinus rhythm, prolonged QT interval.  CXR with mild cardiomegaly.     Review of Systems  Review of Systems   Constitutional: Negative for chills and fever.   Respiratory: Negative for shortness of breath.    Cardiovascular: Negative for chest pain, palpitations and leg swelling.   Gastrointestinal: Negative for abdominal pain, nausea and vomiting.   Genitourinary: Negative for dysuria.   Musculoskeletal: Negative for falls.   Neurological: Negative for dizziness, loss of consciousness and headaches.   Psychiatric/Behavioral: Negative.    All other systems reviewed and are negative.      Past Medical History   has a past medical history of HTN (hypertension), benign (3/16/2012).    Surgical History   has no past surgical history on file.     Family History  family history includes Hypertension in his mother.   He is  unsure of his family history but mother with HTN and CVA.    Social History   reports that he quit smoking about 12 years ago. His smoking use included Cigarettes. He has a 30.00 pack-year smoking history. He has never used smokeless tobacco. He reports that he drinks alcohol. He reports that he does not use drugs.  Quit smoking 10 years prior. Denies alcohol/illicits.  Formerly worked as a .  Presently a .     Allergies  No Known Allergies    Medications  Prior to Admission Medications   Prescriptions Last Dose Informant Patient Reported? Taking?   albuterol 108 (90 Base) MCG/ACT Aero Soln inhalation aerosol 8/17/2018 at 1200 Patient Yes Yes   Sig: Inhale 2 Puffs by mouth every 6 hours as needed for Shortness of Breath.   umeclidinium-vilanterol (ANORO ELLIPTA) 62.5-25 MCG/INH AEROSOL POWDER, BREATH ACTIVATED inhaler 8/17/2018 at 0700 Patient Yes Yes   Sig: Inhale 1 Puff by mouth every day.      Facility-Administered Medications: None       Physical Exam  Blood Pressure: (!) 206/136   Temperature: 36.4 °C (97.5 °F)   Pulse: 87   Respiration: 15   Pulse Oximetry: 94 %     Physical Exam   Constitutional: He is oriented to person, place, and time. He appears well-developed. No distress.   HENT:   Head: Normocephalic.   Mouth/Throat: Oropharynx is clear and moist.   Eyes: Pupils are equal, round, and reactive to light. EOM are normal. Right eye exhibits no discharge. Left eye exhibits no discharge.   Neck: Normal range of motion. Neck supple. No tracheal deviation present.   Cardiovascular: Normal rate, regular rhythm and intact distal pulses.    Pulmonary/Chest: Effort normal and breath sounds normal. No respiratory distress. He has no rales.   Abdominal: Soft. He exhibits no distension. There is no tenderness.   Musculoskeletal: He exhibits no edema or deformity.   Neurological: He is alert and oriented to person, place, and time.   Skin: Skin is warm and dry.   Psychiatric: He has a normal mood and  affect. His behavior is normal.   Frustrated with being admitted   Vitals reviewed.      Laboratory:  Recent Labs      08/17/18   1418   WBC  4.1*   RBC  5.23   HEMOGLOBIN  17.8   HEMATOCRIT  48.5   MCV  92.7   MCH  34.0*   MCHC  36.7*   RDW  37.9   PLATELETCT  206   MPV  10.5     Recent Labs      08/17/18   1418   SODIUM  140   POTASSIUM  3.9   CHLORIDE  105   CO2  22   GLUCOSE  99   BUN  9   CREATININE  0.94   CALCIUM  9.3     Recent Labs      08/17/18   1418   GLUCOSE  99     Recent Labs      08/17/18   1418   APTT  31.3   INR  1.09     Recent Labs      08/17/18   1418   BNPBTYPENAT  128*         Lab Results   Component Value Date    TROPONINI 0.03 08/17/2018       Urinalysis:    No results found     Imaging:  DX-CHEST-PORTABLE (1 VIEW)   Final Result      No acute cardiopulmonary abnormality identified.            Assessment/Plan:  I anticipate this patient is appropriate for observation status at this time.    * Hypertensive urgency- (present on admission)   Assessment & Plan    Hypertensive 2/2 medication noncompliance 2/2 lack of insurance coverage.  - SW consultation  - resume prior home regimen  - IV hydralazine, labetalol PRN        Leukopenia- (present on admission)   Assessment & Plan    Baseline unknown but WBC low at 4.1. No evidence of infection at this time.  - recommend outpatient follow up        Obesity (BMI 30-39.9)- (present on admission)   Assessment & Plan    Body mass index is 33.56 kg/m².   - continued counseling and outpatient follow up        Dyslipidemia- (present on admission)   Assessment & Plan    No past TSH or lipid panel  - lipid panel and TSH level pending            VTE prophylaxis: lovenox.

## 2018-08-17 NOTE — ED TRIAGE NOTES
"64 y/o male ambulatory to triage for evaluation of HTN. Pt states he was at a physical earlier today and was told that his blood pressure was \"too high\". Pt has a history of HTN, he reports not taking his antihypertensive meds this morning. Pt denies symptoms or complaints. He was found to have o2 sats at 87% upon checking in to the ED, pt was placed on 2L oxygen by ED tech.   "

## 2018-08-17 NOTE — ED NOTES
Med rec complete per pt at bedside with S/O  NKDA  No oral ABX within last 30 days     Pt states he is supposed to be on:  Amlodipine   Atorvastatin   Lisinopril   Pt states he has not had any of those medications for 2-3 months because insurance will not cover them

## 2018-08-18 ENCOUNTER — APPOINTMENT (OUTPATIENT)
Dept: RADIOLOGY | Facility: MEDICAL CENTER | Age: 63
DRG: 189 | End: 2018-08-18
Attending: INTERNAL MEDICINE

## 2018-08-18 PROBLEM — R91.8 MULTIPLE PULMONARY NODULES: Status: ACTIVE | Noted: 2018-08-18

## 2018-08-18 PROBLEM — D72.819 LEUKOPENIA: Status: ACTIVE | Noted: 2018-08-18

## 2018-08-18 PROBLEM — I16.0 HYPERTENSIVE URGENCY: Status: ACTIVE | Noted: 2018-08-18

## 2018-08-18 PROBLEM — J96.01 ACUTE RESPIRATORY FAILURE WITH HYPOXEMIA (HCC): Status: ACTIVE | Noted: 2018-08-18

## 2018-08-18 LAB
ALBUMIN SERPL BCP-MCNC: 4 G/DL (ref 3.2–4.9)
ALBUMIN/GLOB SERPL: 1.9 G/DL
ALP SERPL-CCNC: 38 U/L (ref 30–99)
ALT SERPL-CCNC: 17 U/L (ref 2–50)
ANION GAP SERPL CALC-SCNC: 6 MMOL/L (ref 0–11.9)
AST SERPL-CCNC: 16 U/L (ref 12–45)
BILIRUB SERPL-MCNC: 1.1 MG/DL (ref 0.1–1.5)
BUN SERPL-MCNC: 10 MG/DL (ref 8–22)
CALCIUM SERPL-MCNC: 9 MG/DL (ref 8.5–10.5)
CHLORIDE SERPL-SCNC: 104 MMOL/L (ref 96–112)
CHOLEST SERPL-MCNC: 187 MG/DL (ref 100–199)
CO2 SERPL-SCNC: 27 MMOL/L (ref 20–33)
CREAT SERPL-MCNC: 0.98 MG/DL (ref 0.5–1.4)
ERYTHROCYTE [DISTWIDTH] IN BLOOD BY AUTOMATED COUNT: 37.8 FL (ref 35.9–50)
GLOBULIN SER CALC-MCNC: 2.1 G/DL (ref 1.9–3.5)
GLUCOSE SERPL-MCNC: 106 MG/DL (ref 65–99)
HCT VFR BLD AUTO: 49.3 % (ref 42–52)
HDLC SERPL-MCNC: 49 MG/DL
HGB BLD-MCNC: 17.7 G/DL (ref 14–18)
LDLC SERPL CALC-MCNC: 122 MG/DL
MCH RBC QN AUTO: 33.3 PG (ref 27–33)
MCHC RBC AUTO-ENTMCNC: 35.9 G/DL (ref 33.7–35.3)
MCV RBC AUTO: 92.8 FL (ref 81.4–97.8)
PLATELET # BLD AUTO: 223 K/UL (ref 164–446)
PMV BLD AUTO: 10.5 FL (ref 9–12.9)
POTASSIUM SERPL-SCNC: 3.9 MMOL/L (ref 3.6–5.5)
PROT SERPL-MCNC: 6.1 G/DL (ref 6–8.2)
RBC # BLD AUTO: 5.31 M/UL (ref 4.7–6.1)
SODIUM SERPL-SCNC: 137 MMOL/L (ref 135–145)
TRIGL SERPL-MCNC: 80 MG/DL (ref 0–149)
TSH SERPL DL<=0.005 MIU/L-ACNC: 1.18 UIU/ML (ref 0.38–5.33)
WBC # BLD AUTO: 4.3 K/UL (ref 4.8–10.8)

## 2018-08-18 PROCEDURE — 700101 HCHG RX REV CODE 250: Performed by: INTERNAL MEDICINE

## 2018-08-18 PROCEDURE — A9270 NON-COVERED ITEM OR SERVICE: HCPCS | Performed by: HOSPITALIST

## 2018-08-18 PROCEDURE — 700117 HCHG RX CONTRAST REV CODE 255: Performed by: INTERNAL MEDICINE

## 2018-08-18 PROCEDURE — 36415 COLL VENOUS BLD VENIPUNCTURE: CPT

## 2018-08-18 PROCEDURE — 700111 HCHG RX REV CODE 636 W/ 250 OVERRIDE (IP): Performed by: HOSPITALIST

## 2018-08-18 PROCEDURE — 71275 CT ANGIOGRAPHY CHEST: CPT

## 2018-08-18 PROCEDURE — 80061 LIPID PANEL: CPT

## 2018-08-18 PROCEDURE — 80053 COMPREHEN METABOLIC PANEL: CPT

## 2018-08-18 PROCEDURE — 700102 HCHG RX REV CODE 250 W/ 637 OVERRIDE(OP): Performed by: HOSPITALIST

## 2018-08-18 PROCEDURE — 700102 HCHG RX REV CODE 250 W/ 637 OVERRIDE(OP): Performed by: INTERNAL MEDICINE

## 2018-08-18 PROCEDURE — G0378 HOSPITAL OBSERVATION PER HR: HCPCS

## 2018-08-18 PROCEDURE — 94640 AIRWAY INHALATION TREATMENT: CPT

## 2018-08-18 PROCEDURE — 85027 COMPLETE CBC AUTOMATED: CPT

## 2018-08-18 PROCEDURE — 99225 PR SUBSEQUENT OBSERVATION CARE,LEVEL II: CPT | Performed by: INTERNAL MEDICINE

## 2018-08-18 PROCEDURE — A9270 NON-COVERED ITEM OR SERVICE: HCPCS | Performed by: INTERNAL MEDICINE

## 2018-08-18 PROCEDURE — 84443 ASSAY THYROID STIM HORMONE: CPT

## 2018-08-18 RX ORDER — ATORVASTATIN CALCIUM 40 MG/1
40 TABLET, FILM COATED ORAL EVERY EVENING
Status: DISCONTINUED | OUTPATIENT
Start: 2018-08-18 | End: 2018-08-20 | Stop reason: HOSPADM

## 2018-08-18 RX ORDER — LISINOPRIL 10 MG/1
10 TABLET ORAL
Status: DISCONTINUED | OUTPATIENT
Start: 2018-08-18 | End: 2018-08-19

## 2018-08-18 RX ORDER — AMLODIPINE BESYLATE 10 MG/1
10 TABLET ORAL
Status: DISCONTINUED | OUTPATIENT
Start: 2018-08-18 | End: 2018-08-20 | Stop reason: HOSPADM

## 2018-08-18 RX ORDER — PRAVASTATIN SODIUM 20 MG
10 TABLET ORAL EVERY EVENING
Status: DISCONTINUED | OUTPATIENT
Start: 2018-08-18 | End: 2018-08-18

## 2018-08-18 RX ORDER — HYDROCHLOROTHIAZIDE 25 MG/1
25 TABLET ORAL DAILY
Qty: 30 TAB | Refills: 1 | Status: SHIPPED | OUTPATIENT
Start: 2018-08-19 | End: 2018-08-20

## 2018-08-18 RX ORDER — PRAVASTATIN SODIUM 20 MG
20 TABLET ORAL
Qty: 30 TAB | Refills: 1 | Status: SHIPPED | OUTPATIENT
Start: 2018-08-18 | End: 2018-08-20

## 2018-08-18 RX ORDER — HYDROCHLOROTHIAZIDE 25 MG/1
25 TABLET ORAL
Status: DISCONTINUED | OUTPATIENT
Start: 2018-08-18 | End: 2018-08-20 | Stop reason: HOSPADM

## 2018-08-18 RX ORDER — LISINOPRIL 10 MG/1
10 TABLET ORAL DAILY
Qty: 90 TAB | Refills: 1 | Status: SHIPPED | OUTPATIENT
Start: 2018-08-19 | End: 2018-08-20

## 2018-08-18 RX ORDER — ALBUTEROL SULFATE 90 UG/1
2 AEROSOL, METERED RESPIRATORY (INHALATION) EVERY 6 HOURS PRN
Qty: 1 INHALER | Refills: 1 | Status: SHIPPED | OUTPATIENT
Start: 2018-08-18 | End: 2018-08-20

## 2018-08-18 RX ORDER — AMLODIPINE BESYLATE 10 MG/1
10 TABLET ORAL DAILY
Qty: 90 TAB | Refills: 0 | Status: SHIPPED | OUTPATIENT
Start: 2018-08-19 | End: 2018-08-20

## 2018-08-18 RX ORDER — IPRATROPIUM BROMIDE AND ALBUTEROL SULFATE 2.5; .5 MG/3ML; MG/3ML
3 SOLUTION RESPIRATORY (INHALATION)
Status: DISCONTINUED | OUTPATIENT
Start: 2018-08-18 | End: 2018-08-19

## 2018-08-18 RX ADMIN — IOHEXOL 100 ML: 350 INJECTION, SOLUTION INTRAVENOUS at 13:37

## 2018-08-18 RX ADMIN — ATORVASTATIN CALCIUM 40 MG: 40 TABLET, FILM COATED ORAL at 18:35

## 2018-08-18 RX ADMIN — LABETALOL HYDROCHLORIDE 10 MG: 5 INJECTION, SOLUTION INTRAVENOUS at 02:50

## 2018-08-18 RX ADMIN — ENOXAPARIN SODIUM 40 MG: 100 INJECTION SUBCUTANEOUS at 06:16

## 2018-08-18 RX ADMIN — LISINOPRIL 10 MG: 10 TABLET ORAL at 06:16

## 2018-08-18 RX ADMIN — HYDROCHLOROTHIAZIDE 25 MG: 25 TABLET ORAL at 06:16

## 2018-08-18 RX ADMIN — UMECLIDINIUM BROMIDE AND VILANTEROL TRIFENATATE 1 PUFF: 62.5; 25 POWDER RESPIRATORY (INHALATION) at 06:00

## 2018-08-18 RX ADMIN — IPRATROPIUM BROMIDE AND ALBUTEROL SULFATE 3 ML: .5; 3 SOLUTION RESPIRATORY (INHALATION) at 14:55

## 2018-08-18 RX ADMIN — AMLODIPINE BESYLATE 10 MG: 10 TABLET ORAL at 06:16

## 2018-08-18 RX ADMIN — IPRATROPIUM BROMIDE AND ALBUTEROL SULFATE 3 ML: .5; 3 SOLUTION RESPIRATORY (INHALATION) at 18:35

## 2018-08-18 ASSESSMENT — PAIN SCALES - GENERAL
PAINLEVEL_OUTOF10: 0

## 2018-08-18 ASSESSMENT — ENCOUNTER SYMPTOMS
HEADACHES: 0
DIZZINESS: 0
SINUS PAIN: 0
PSYCHIATRIC NEGATIVE: 1
LOSS OF CONSCIOUSNESS: 0
SHORTNESS OF BREATH: 0
NAUSEA: 0
CHILLS: 0
SPUTUM PRODUCTION: 0
VOMITING: 0
ORTHOPNEA: 0
PND: 0
FALLS: 0
COUGH: 0
ABDOMINAL PAIN: 0
FEVER: 0
BLURRED VISION: 0
PALPITATIONS: 0

## 2018-08-18 ASSESSMENT — PATIENT HEALTH QUESTIONNAIRE - PHQ9
SUM OF ALL RESPONSES TO PHQ9 QUESTIONS 1 AND 2: 0
1. LITTLE INTEREST OR PLEASURE IN DOING THINGS: NOT AT ALL
2. FEELING DOWN, DEPRESSED, IRRITABLE, OR HOPELESS: NOT AT ALL

## 2018-08-18 NOTE — PROGRESS NOTES
Bedside report received from RASHID Manjarrez. Pt sitting bedside. BP trending down this AM. Placed on room air. No complaints, needs, concerns at this time. Will continue to monitor.

## 2018-08-18 NOTE — PROGRESS NOTES
Patient arrives to floor. Admission started, vitals taken.      BP with regular cuff 206/121    Large suff 192-122    Manual BP  194/108    Patient has 10 mg Labetalol IV ordered PRN

## 2018-08-18 NOTE — ASSESSMENT & PLAN NOTE
Hypertensive 2/2 medication noncompliance 2/2 lack of insurance coverage.  - SW consultation  - increase lisinopril to 20 mg for better bp control. Continue HCTZ and amlodipine.  -- discussed with patient that these medication can be obtain from the [facility] at low cost.  Patient expressed understanding.  - IV hydralazine, labetalol PRN

## 2018-08-18 NOTE — PROGRESS NOTES
2 RN skin check done with RASHID Ferreira. Pt has dry, calloused heels. All bony prominences, skin folds, back, sacrum, behind ears, back of head assessed. No open wounds. Skin intact.

## 2018-08-18 NOTE — CARE PLAN
Problem: Safety  Goal: Will remain free from injury  Outcome: PROGRESSING AS EXPECTED  Fall precautions in place. Treaded socks on pt. Appropriate signs on doorway. Bedrails up. Bed in lowest position and locked.  Call light and phone within reach. Patient educated on importance of calling nurses before getting out of bed, verbalizes understanding.     Problem: Knowledge Deficit  Goal: Knowledge of disease process/condition, treatment plan, diagnostic tests, and medications will improve  Outcome: PROGRESSING AS EXPECTED  Patient educated about POC.  All questions answered in regards to disease process, treatment, and medications.  Patient verbalized understanding and voiced no further questions at this time.

## 2018-08-18 NOTE — RESPIRATORY CARE
COPD EDUCATION by COPD CLINICAL EDUCATOR  8/18/2018  at  11:10 AM by Etta Daigle     Patient interviewed by COPD/Lung Disease education team.  Patient unable to participate in full program.  Short intervention has been conducted.  A comprehensive packet including information about COPD/Asthma, treatments, and smoking cessation given.  He states he does not have COPD. We reviewed his Asthma medications. Provided a coupon/flyer for his Breo medication to determine if he is eligible.

## 2018-08-18 NOTE — CARE PLAN
Problem: Respiratory:  Goal: Respiratory status will improve  Outcome: PROGRESSING SLOWER THAN EXPECTED  Walking O2 sat study shows pt will require home O2 to be used with activity.    Problem: Safety  Goal: Will remain free from falls  Outcome: PROGRESSING AS EXPECTED  Pt OOB with standby assist to help with O2 tubing.

## 2018-08-18 NOTE — PROGRESS NOTES
Report received by Cassandra MIKE. Assumed care of pt. Assessment complete, tele box on. Pt A&Ox4, VSS. Pt in no apparent signs of distress. Plan of care discussed. Call light within reach, bed in lowest position, and pt has no further questions at this time.

## 2018-08-18 NOTE — ASSESSMENT & PLAN NOTE
Baseline unknown but WBC fluctuate in narrow range. No evidence of infection at this time.  - recommend outpatient follow up

## 2018-08-19 LAB
ALBUMIN SERPL BCP-MCNC: 4.2 G/DL (ref 3.2–4.9)
BASOPHILS # BLD AUTO: 0.7 % (ref 0–1.8)
BASOPHILS # BLD: 0.03 K/UL (ref 0–0.12)
BUN SERPL-MCNC: 13 MG/DL (ref 8–22)
CALCIUM SERPL-MCNC: 9.2 MG/DL (ref 8.5–10.5)
CHLORIDE SERPL-SCNC: 102 MMOL/L (ref 96–112)
CO2 SERPL-SCNC: 28 MMOL/L (ref 20–33)
CREAT SERPL-MCNC: 1.05 MG/DL (ref 0.5–1.4)
EOSINOPHIL # BLD AUTO: 0.35 K/UL (ref 0–0.51)
EOSINOPHIL NFR BLD: 8.3 % (ref 0–6.9)
ERYTHROCYTE [DISTWIDTH] IN BLOOD BY AUTOMATED COUNT: 37.5 FL (ref 35.9–50)
GLUCOSE SERPL-MCNC: 107 MG/DL (ref 65–99)
HCT VFR BLD AUTO: 51.8 % (ref 42–52)
HGB BLD-MCNC: 18.3 G/DL (ref 14–18)
IMM GRANULOCYTES # BLD AUTO: 0.01 K/UL (ref 0–0.11)
IMM GRANULOCYTES NFR BLD AUTO: 0.2 % (ref 0–0.9)
LV EJECT FRACT  99904: 55
LV EJECT FRACT MOD 2C 99903: 52.78
LV EJECT FRACT MOD 4C 99902: 52.31
LV EJECT FRACT MOD BP 99901: 51.55
LYMPHOCYTES # BLD AUTO: 1.94 K/UL (ref 1–4.8)
LYMPHOCYTES NFR BLD: 46.1 % (ref 22–41)
MCH RBC QN AUTO: 32.7 PG (ref 27–33)
MCHC RBC AUTO-ENTMCNC: 35.3 G/DL (ref 33.7–35.3)
MCV RBC AUTO: 92.7 FL (ref 81.4–97.8)
MONOCYTES # BLD AUTO: 0.42 K/UL (ref 0–0.85)
MONOCYTES NFR BLD AUTO: 10 % (ref 0–13.4)
NEUTROPHILS # BLD AUTO: 1.46 K/UL (ref 1.82–7.42)
NEUTROPHILS NFR BLD: 34.7 % (ref 44–72)
NRBC # BLD AUTO: 0 K/UL
NRBC BLD-RTO: 0 /100 WBC
PHOSPHATE SERPL-MCNC: 4.5 MG/DL (ref 2.5–4.5)
PLATELET # BLD AUTO: 218 K/UL (ref 164–446)
PMV BLD AUTO: 10.5 FL (ref 9–12.9)
POTASSIUM SERPL-SCNC: 3.7 MMOL/L (ref 3.6–5.5)
RBC # BLD AUTO: 5.59 M/UL (ref 4.7–6.1)
SODIUM SERPL-SCNC: 140 MMOL/L (ref 135–145)
WBC # BLD AUTO: 4.2 K/UL (ref 4.8–10.8)

## 2018-08-19 PROCEDURE — A9270 NON-COVERED ITEM OR SERVICE: HCPCS | Performed by: INTERNAL MEDICINE

## 2018-08-19 PROCEDURE — 700102 HCHG RX REV CODE 250 W/ 637 OVERRIDE(OP): Performed by: HOSPITALIST

## 2018-08-19 PROCEDURE — 80069 RENAL FUNCTION PANEL: CPT

## 2018-08-19 PROCEDURE — 94760 N-INVAS EAR/PLS OXIMETRY 1: CPT

## 2018-08-19 PROCEDURE — 94640 AIRWAY INHALATION TREATMENT: CPT

## 2018-08-19 PROCEDURE — 93308 TTE F-UP OR LMTD: CPT | Mod: 26 | Performed by: INTERNAL MEDICINE

## 2018-08-19 PROCEDURE — 700111 HCHG RX REV CODE 636 W/ 250 OVERRIDE (IP): Performed by: INTERNAL MEDICINE

## 2018-08-19 PROCEDURE — 85025 COMPLETE CBC W/AUTO DIFF WBC: CPT

## 2018-08-19 PROCEDURE — A9270 NON-COVERED ITEM OR SERVICE: HCPCS | Performed by: HOSPITALIST

## 2018-08-19 PROCEDURE — 99233 SBSQ HOSP IP/OBS HIGH 50: CPT | Performed by: INTERNAL MEDICINE

## 2018-08-19 PROCEDURE — 36415 COLL VENOUS BLD VENIPUNCTURE: CPT

## 2018-08-19 PROCEDURE — 700111 HCHG RX REV CODE 636 W/ 250 OVERRIDE (IP): Performed by: HOSPITALIST

## 2018-08-19 PROCEDURE — 770020 HCHG ROOM/CARE - TELE (206)

## 2018-08-19 PROCEDURE — 700102 HCHG RX REV CODE 250 W/ 637 OVERRIDE(OP): Performed by: INTERNAL MEDICINE

## 2018-08-19 PROCEDURE — 93306 TTE W/DOPPLER COMPLETE: CPT

## 2018-08-19 PROCEDURE — 700101 HCHG RX REV CODE 250: Performed by: INTERNAL MEDICINE

## 2018-08-19 RX ORDER — LISINOPRIL 20 MG/1
20 TABLET ORAL
Status: DISCONTINUED | OUTPATIENT
Start: 2018-08-20 | End: 2018-08-20 | Stop reason: HOSPADM

## 2018-08-19 RX ORDER — BUDESONIDE 0.5 MG/2ML
0.5 INHALANT ORAL
Status: DISCONTINUED | OUTPATIENT
Start: 2018-08-19 | End: 2018-08-20 | Stop reason: HOSPADM

## 2018-08-19 RX ADMIN — ATORVASTATIN CALCIUM 40 MG: 40 TABLET, FILM COATED ORAL at 18:22

## 2018-08-19 RX ADMIN — LISINOPRIL 10 MG: 10 TABLET ORAL at 05:12

## 2018-08-19 RX ADMIN — UMECLIDINIUM BROMIDE AND VILANTEROL TRIFENATATE 1 PUFF: 62.5; 25 POWDER RESPIRATORY (INHALATION) at 06:00

## 2018-08-19 RX ADMIN — IPRATROPIUM BROMIDE AND ALBUTEROL SULFATE 3 ML: .5; 3 SOLUTION RESPIRATORY (INHALATION) at 14:48

## 2018-08-19 RX ADMIN — HYDROCHLOROTHIAZIDE 25 MG: 25 TABLET ORAL at 05:12

## 2018-08-19 RX ADMIN — BUDESONIDE 0.5 MG: 0.5 SUSPENSION RESPIRATORY (INHALATION) at 18:09

## 2018-08-19 RX ADMIN — AMLODIPINE BESYLATE 10 MG: 10 TABLET ORAL at 05:12

## 2018-08-19 RX ADMIN — ENOXAPARIN SODIUM 40 MG: 100 INJECTION SUBCUTANEOUS at 05:12

## 2018-08-19 ASSESSMENT — ENCOUNTER SYMPTOMS
SHORTNESS OF BREATH: 0
ABDOMINAL PAIN: 0
PND: 0
SPUTUM PRODUCTION: 0
COUGH: 0
FEVER: 0
ORTHOPNEA: 0
BLURRED VISION: 0
CHILLS: 0
SINUS PAIN: 0
DIZZINESS: 0
PALPITATIONS: 0

## 2018-08-19 ASSESSMENT — PAIN SCALES - GENERAL
PAINLEVEL_OUTOF10: 0

## 2018-08-19 ASSESSMENT — PATIENT HEALTH QUESTIONNAIRE - PHQ9
1. LITTLE INTEREST OR PLEASURE IN DOING THINGS: NOT AT ALL
2. FEELING DOWN, DEPRESSED, IRRITABLE, OR HOPELESS: NOT AT ALL
SUM OF ALL RESPONSES TO PHQ9 QUESTIONS 1 AND 2: 0

## 2018-08-19 NOTE — CARE PLAN
Problem: Safety  Goal: Will remain free from injury  Outcome: PROGRESSING AS EXPECTED  Fall precautions in place. Treaded socks on pt. Appropriate signs on doorway. Bedrails up. Bed in lowest position and locked.  Call light and phone within reach. Patient educated on importance of calling nurses before getting out of bed, verbalizes understanding.     Problem: Knowledge Deficit  Goal: Knowledge of disease process/condition, treatment plan, diagnostic tests, and medications will improve  Outcome: PROGRESSING AS EXPECTED  Patient educated about POC.  All questions answered in regards to disease process, treatment plan, and also medications.  Patient verbalized understanding and voiced no further questions at this time.

## 2018-08-19 NOTE — PROGRESS NOTES
Bedside report received from RASHID Manjarrez. Pt up to bathroom. Still requiring 2L O2 NC with activity. Heart rate sustained in the low 130s while pt was washing up in the bathroom. Denied any palpitations or chest pain at that time. HR now mid-90s at rest. Plan for d/c pending ability to acquire home O2. No complaints, needs, concerns at this time. Call light within reach. Will continue to monitor.

## 2018-08-19 NOTE — PROGRESS NOTES
Pt sleeping, appears comfortable. Respirations even and unlabored. Call light within reach. No needs at this time. Will continue to monitor.

## 2018-08-19 NOTE — PROGRESS NOTES
Report received by Maddie MIKE. Assumed care of pt. Assessment complete, tele box on. Pt A&Ox4, VSS. Pt in no apparent signs of distress. Plan of care discussed. Call light within reach, bed in lowest position, and pt has no further questions at this time.

## 2018-08-19 NOTE — PROGRESS NOTES
[facility] Hospitalist Progress Note    Date of Service: 2018    Chief Complaint  63 y.o. male admitted 2018 with elevated bp during physical exam.    Interval Problem Update  :  Patient's bp is better controlled today.  However, patient o2sat goes to the low 80's during ambulation.   Patient reports diagnosis of asthma by outpatient pulmonologist.  CT angio chest is negative for pulmonary embolism.      Consultants/Specialty  None    Disposition   home        Review of Systems   Constitutional: Negative for chills and fever.   HENT: Negative for congestion and sinus pain.    Eyes: Negative for blurred vision.   Respiratory: Negative for cough, sputum production and shortness of breath.    Cardiovascular: Negative for chest pain, palpitations, orthopnea, leg swelling and PND.   Gastrointestinal: Negative for abdominal pain.   Genitourinary: Negative for dysuria.   Skin: Negative for itching and rash.   Neurological: Negative for dizziness.      Physical Exam  Laboratory/Imaging   Hemodynamics  Temp (24hrs), Av.7 °C (98.1 °F), Min:36.5 °C (97.7 °F), Max:37.2 °C (98.9 °F)   Temperature: 36.6 °C (97.8 °F)  Pulse  Av.1  Min: 70  Max: 95    Blood Pressure: 136/89      Respiratory      Respiration: 20, Pulse Oximetry: 95 %, O2 Daily Delivery Respiratory : Room Air with O2 Available     Given By:: Mouthpiece  RUL Breath Sounds: Clear, RML Breath Sounds: Diminished, RLL Breath Sounds: Diminished, FRAN Breath Sounds: Clear, LLL Breath Sounds: Diminished    Fluids    Intake/Output Summary (Last 24 hours) at 184  Last data filed at 18 1330   Gross per 24 hour   Intake              360 ml   Output             1200 ml   Net             -840 ml       Nutrition  Orders Placed This Encounter   Procedures   • Diet Order Regular     Standing Status:   Standing     Number of Occurrences:   1     Order Specific Question:   Diet:     Answer:   Regular [1]     Physical Exam   Constitutional: He is  oriented to person, place, and time. No distress.   HENT:   Head: Normocephalic.   Mouth/Throat: Oropharynx is clear and moist. No oropharyngeal exudate.   Eyes: Pupils are equal, round, and reactive to light. Right eye exhibits no discharge. Left eye exhibits no discharge. No scleral icterus.   Neck: Neck supple. No JVD present.   Cardiovascular: Normal rate and regular rhythm.    No murmur heard.  Pulmonary/Chest: Effort normal and breath sounds normal. No respiratory distress. He has no wheezes.   Abdominal: Soft. Bowel sounds are normal. He exhibits no distension.   Musculoskeletal: Normal range of motion. He exhibits no edema.   Neurological: He is alert and oriented to person, place, and time. No cranial nerve deficit.   Skin: Skin is warm and dry. He is not diaphoretic.   Psychiatric: He has a normal mood and affect.       Recent Labs      08/17/18   1418  08/18/18   0150   WBC  4.1*  4.3*   RBC  5.23  5.31   HEMOGLOBIN  17.8  17.7   HEMATOCRIT  48.5  49.3   MCV  92.7  92.8   MCH  34.0*  33.3*   MCHC  36.7*  35.9*   RDW  37.9  37.8   PLATELETCT  206  223   MPV  10.5  10.5     Recent Labs      08/17/18   1418  08/18/18   0150   SODIUM  140  137   POTASSIUM  3.9  3.9   CHLORIDE  105  104   CO2  22  27   GLUCOSE  99  106*   BUN  9  10   CREATININE  0.94  0.98   CALCIUM  9.3  9.0     Recent Labs      08/17/18   1418   APTT  31.3   INR  1.09     Recent Labs      08/17/18   1418   BNPBTYPENAT  128*     Recent Labs      08/18/18   0150   TRIGLYCERIDE  80   HDL  49   LDL  122*          Assessment/Plan     * Hypertensive urgency- (present on admission)   Assessment & Plan    Hypertensive 2/2 medication noncompliance 2/2 lack of insurance coverage.  -  consultation  - resumed prior home regimen  -- discussed with patient that these medication can be obtain from the [facility] at low cost.  Patient expressed understanding.  - IV hydralazine, labetalol PRN        Acute respiratory failure with hypoxemia (HCC)   Assessment  & Plan    Resting room air oxygen saturation is in the low 90s.  Drop to low 80's during ambulation.  Will given svn scheduled and repeat exam in morning. If no improvement, the patient will need home oxygen.        Multiple pulmonary nodules   Assessment & Plan    CT angio chest found Small bilateral pulmonary nodules which measure up to 5 mm in size  High Risk - History of smoking or of other known risk factors.  Per Fleischner Society 2017 Guidelines for Management of Incidentally Detected Pulmonary Nodules in Adults, repeat CT chest at 12 months        Leukopenia- (present on admission)   Assessment & Plan    Baseline unknown but WBC low at 4.1. No evidence of infection at this time.  - recommend outpatient follow up        Obesity (BMI 30-39.9)- (present on admission)   Assessment & Plan    Body mass index is 33.56 kg/m².   - continued counseling and outpatient follow up        Uncomplicated asthma- (present on admission)   Assessment & Plan    No wheezes on examination.  CTA chest actually indicates emphysema which is compatible with the patient's history tobacco smoking.   Suspect the patient has copd instead.        Dyslipidemia- (present on admission)   Assessment & Plan    LDL is 120  -- resume statin therapy.          Quality-Core Measures   Reviewed items::  Labs reviewed and Medications reviewed  DVT prophylaxis pharmacological::  Enoxaparin (Lovenox)

## 2018-08-19 NOTE — ASSESSMENT & PLAN NOTE
Resting room air oxygen saturation is in the low 90s.  Drop to low 80's during ambulation.  There is no improvement despite breathing treatment. Consult pulmonologist.    Order home oxygen. Mild cardiomegaly on imaging. Echocardiogram indicates Left ventricular ejection fraction is visually estimated to be 55%.  No significant valve disease or flow abnormalities.   -- hypoxemia likely a chronic issue for this patient as he has emphysema

## 2018-08-19 NOTE — ASSESSMENT & PLAN NOTE
No wheezes on examination.  CTA chest actually indicates emphysema which is compatible with the patient's history tobacco smoking.   Suspect the patient has copd instead of asthma.    Continue breathing treatment.

## 2018-08-19 NOTE — ASSESSMENT & PLAN NOTE
CT angio chest found Small bilateral pulmonary nodules which measure up to 5 mm in size  High Risk - History of smoking or of other known risk factors.  Per Fleischner Society 2017 Guidelines for Management of Incidentally Detected Pulmonary Nodules in Adults, repeat CT chest at 12 months

## 2018-08-19 NOTE — FACE TO FACE
Face to Face Note  -  Durable Medical Equipment    Gallito Szymanski D.O. - SABINEI: [phone]  I certify that this patient is under my care and that they had a durable medical equipment(DME)face to face encounter by myself that meets the physician DME face-to-face encounter requirements with this patient on:    Date of encounter:   Patient:                    MRN:                       YOB: 2018  Zander Domínguez  [id number]  1955     The encounter with the patient was in whole, or in part, for the following medical condition, which is the primary reason for durable medical equipment:  COPD    I certify that, based on my findings, the following durable medical equipment is medically necessary:  Oxygen.    HOME O2 Saturation Measurements:(Values must be present for Home Oxygen orders)  Room air sat at rest: 94  Room air sat with amb: 79  With liters of O2: 0, O2 sat at rest with O2: 94  With Liters of O2: 2, O2 sat with amb with O2 : 90  Is the patient mobile?: Yes    My Clinical findings support the need for the above equipment due to:  Hypoxia    Supporting Symptoms: shortness of breath

## 2018-08-19 NOTE — CONSULTS
Pulmonary Consultation       Patient ID:   Name:             Zander Domínguez   YOB: 1955  Age:                 63 y.o.  male   MRN:               [id number]                                                  Reason of Consult:  Hypoxia    Requesting physician:  Dr. 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 He has a history of hypertension and has been on 2 medications consisting of amlodipine and lisinopril hydrochlorothiazide. However he lost his medical insurance and he was unable to secure this medication and has been off medication for the last couple months. Since admission his blood pressure is now under control , however he was found also to have hypoxemia especially with ambulation going down as low as 80% room air. He also has history of COPD diagnosed 6 years ago and when he saw a pulmonologist 2 years ago he was told to have asthma and he was put on Breo daily and albuterol when necessary.  He has not been on oxygen at home and I believe he should qualify at this time because of desaturation. He denies fever chills change in mucus production no chest pain no hemoptysis no headaches and syncopal episodes. He claims to have heavy snoring sometimes waking him up and his desaturation is worse at night. He was told that he will need a sleep study as an outpatient.    ROS:  Constitutional ROS: No unexpected change in weight  Mouth/Throat ROS: No sore throat, No recent change in voice or hoarseness, Positive for heavy snoring  Neck ROS: No swollen glands, No significant pain in neck  Pulmonary ROS: No chronic cough, sputum, or hemoptysis, has shortness of breath  Cardiovascular ROS: No diaphoresis, No cyanosis, No edema, No palpitations, No syncope  Gastrointestinal ROS: No nausea, vomiting, diarrhea, or constipation  Musculoskeletal/Extremities ROS: No  clubbing, No cyanosis, No peripheral edema  Hematologic/Lymphatic ROS: No coagulation disorder, No bruising, No swollen nodes, No weight loss, No history of transfusion  Skin/Integumentary ROS: No evidence of bleeding or bruising  Neurologic ROS: No seizures, No weakness  Psychiatric ROS: No depression, No anxiety    Past Medical History:  Past Medical History:   Diagnosis Date   • HTN (hypertension), benign 3/16/2012       Past surgical history:  History reviewed. No pertinent surgical history.    Family History:  Family History   Problem Relation Age of Onset   • Hypertension Mother    • Diabetes Neg Hx    • Cancer Neg Hx    • Psychiatry Neg Hx    • Heart Disease Neg Hx        Hospital Medications:    Current Facility-Administered Medications:   •  [START ON 8/20/2018] lisinopril (PRINIVIL) tablet 20 mg, 20 mg, Oral, Q DAY, Gallito Szymanski D.O.  •  enoxaparin (LOVENOX) inj 40 mg, 40 mg, Subcutaneous, DAILY, Corrine Ahuja M.D., 40 mg at 08/19/18 0512  •  amLODIPine (NORVASC) tablet 10 mg, 10 mg, Oral, Q DAY, Corrine Ahuja M.D., 10 mg at 08/19/18 0512  •  hydroCHLOROthiazide (HYDRODIURIL) tablet 25 mg, 25 mg, Oral, Q DAY, Corrine Ahuja M.D., 25 mg at 08/19/18 0512  •  atorvastatin (LIPITOR) tablet 40 mg, 40 mg, Oral, Q EVENING, Gallito Szymanski D.O., 40 mg at 08/18/18 1835  •  ipratropium-albuterol (DUONEB) nebulizer solution, 3 mL, Nebulization, Q6HRS (RT), Gallito Szymanski D.O., 3 mL at 08/19/18 1448  •  albuterol inhaler 2 Puff, 2 Puff, Inhalation, Q6HRS PRN, Corrine Ahuja M.D.  •  umeclidinium-vilanterol (ANORO ELLIPTA) inhaler 1 Puff, 1 Puff, Inhalation, DAILY, Corrine Ahuja M.D., 1 Puff at 08/19/18 0600  •  senna-docusate (PERICOLACE or SENOKOT S) 8.6-50 MG per tablet 2 Tab, 2 Tab, Oral, BID **AND** polyethylene glycol/lytes (MIRALAX) PACKET 1 Packet, 1 Packet, Oral, QDAY PRN **AND** magnesium hydroxide (MILK OF MAGNESIA) suspension 30 mL, 30 mL, Oral, QDAY PRN **AND** bisacodyl (DULCOLAX)  "suppository 10 mg, 10 mg, Rectal, QDAY PRN, Corrine Ahuja M.D.  •  Respiratory Care per Protocol, , Nebulization, Continuous RT, Corrine Ahuja M.D.  •  ondansetron (ZOFRAN) syringe/vial injection 4 mg, 4 mg, Intravenous, Q4HRS PRN, Corrine Ahuja M.D.  •  ondansetron (ZOFRAN ODT) dispertab 4 mg, 4 mg, Oral, Q4HRS PRN, Corrine Ahuja M.D.  •  promethazine (PHENERGAN) tablet 12.5-25 mg, 12.5-25 mg, Oral, Q4HRS PRN, Corrine Ahuja M.D.  •  promethazine (PHENERGAN) suppository 12.5-25 mg, 12.5-25 mg, Rectal, Q4HRS PRN, Corrine Ahuja M.D.  •  prochlorperazine (COMPAZINE) injection 5-10 mg, 5-10 mg, Intravenous, Q4HRS PRN, Corrine Ahuja M.D.  •  labetalol (NORMODYNE,TRANDATE) injection 10 mg, 10 mg, Intravenous, Q4HRS PRN, Corrine Ahuja M.D., 10 mg at 08/18/18 0250  •  acetaminophen (TYLENOL) tablet 650 mg, 650 mg, Oral, Q6HRS PRN, Corrine Ahuja M.D.  •  hydrALAZINE (APRESOLINE) injection 10 mg, 10 mg, Intravenous, Q6HRS PRN, Corrine Ahuja M.D.    Current Outpatient Medications:  Prescriptions Prior to Admission   Medication Sig Dispense Refill Last Dose   • umeclidinium-vilanterol (ANORO ELLIPTA) 62.5-25 MCG/INH AEROSOL POWDER, BREATH ACTIVATED inhaler Inhale 1 Puff by mouth every day.   8/17/2018 at 0700       Medication Allergy:  No Known Allergies          Objective:   Vitals/ General Appearance:   Weight/BMI: Body mass index is 32.61 kg/m².  Blood pressure 144/93, pulse 78, temperature 37.2 °C (98.9 °F), resp. rate 16, height 1.778 m (5' 10\"), weight 103.1 kg (227 lb 4.7 oz), SpO2 95 %.  Vitals:    08/19/18 0344 08/19/18 0728 08/19/18 1127 08/19/18 1448   BP: (!) 161/103 148/89 144/93    Pulse: (!) 105 99 93 78   Resp: 18 18 17 16   Temp: 36.9 °C (98.5 °F) 36.4 °C (97.5 °F) 37.2 °C (98.9 °F)    SpO2: 89% 90% 90% 95%   Weight:       Height:         Oxygen Therapy:  Pulse Oximetry: 95 %, O2 (LPM): 0, O2 Delivery: None (Room Air)    Constitutional:   Well developed, Well " nourished, No acute distress; obese  HENMT:  Normocephalic, Atraumatic, Oropharynx overcrowding , Mallampati 3; moist mucous membranes, No oral exudates, Nose normal.  No thyromegaly.  Eyes:  EOMI, Conjunctiva normal, No discharge.  Neck:  Normal range of motion, No cervical tenderness,  no JVD.  Cardiovascular:  Normal heart rate, Normal rhythm, No murmurs, No rubs, No gallops.   Extremitites with intact distal pulses, no cyanosis, or edema.  Lungs:  Normal breath sounds, breath sounds clear to auscultation bilaterally,  no crackles, no wheezing.   Abdomen: Bowel sounds normal, Soft, No tenderness, No guarding, No rebound, No masses, No hepatosplenomegaly.  Skin: Warm, Dry, No erythema, No rash, no induration.  Neurologic: Alert & oriented x 3, No focal deficits noted, cranial nerves II through X are grossly intact.  Psychiatric: Affect normal, Judgment normal, Mood normal.    Labs:  Recent Labs      08/17/18   1418  08/18/18   0150  08/19/18   0257   WBC  4.1*  4.3*  4.2*   RBC  5.23  5.31  5.59   HEMOGLOBIN  17.8  17.7  18.3*   HEMATOCRIT  48.5  49.3  51.8   MCV  92.7  92.8  92.7   MCH  34.0*  33.3*  32.7   MCHC  36.7*  35.9*  35.3   RDW  37.9  37.8  37.5   PLATELETCT  206  223  218   MPV  10.5  10.5  10.5     Recent Labs      08/17/18   1418   APTT  31.3   INR  1.09     Recent Labs      08/17/18   1418   BNPBTYPENAT  128*     Recent Labs      08/17/18   1418   TROPONINI  0.03   BNPBTYPENAT  128*     Recent Labs      08/17/18   1418  08/18/18   0150  08/19/18   0257   SODIUM  140  137  140   POTASSIUM  3.9  3.9  3.7   CHLORIDE  105  104  102   CO2  22  27  28   GLUCOSE  99  106*  107*   BUN  9  10  13     Recent Labs      08/17/18   1418  08/18/18   0150  08/19/18   0257   SODIUM  140  137  140   POTASSIUM  3.9  3.9  3.7   CHLORIDE  105  104  102   CO2  22  27  28   BUN  9  10  13   CREATININE  0.94  0.98  1.05   PHOSPHORUS   --    --   4.5   CALCIUM  9.3  9.0  9.2     No results found for this or any previous  visit.      Imaging:   CT-CTA CHEST PULMONARY ARTERY W/ RECONS   Final Result      1.  No evidence of pulmonary embolus.      2.  Small bilateral pulmonary nodules which measure up to 5 mm in size.      3.  Emphysematous change of the lungs.      Low Risk: No routine follow-up      High Risk: Optional CT at 12 months      Comments: Use most suspicious nodule as guide to management. Follow-up intervals may vary according to size and risk.      Low Risk - Minimal or absent history of smoking and of other known risk factors.      High Risk - History of smoking or of other known risk factors.      Note: These recommendations do not apply to lung cancer screening, patients with immunosuppression, or patients with known primary cancer.      Fleischner Society 2017 Guidelines for Management of Incidentally Detected Pulmonary Nodules in Adults      DX-CHEST-PORTABLE (1 VIEW)   Final Result      No acute cardiopulmonary abnormality identified.      ECHOCARDIOGRAM COMP W/O CONT    (Results Pending)           Assessment and Plan:  Principal Problem:    Hypertensive urgency POA: Yes; now better controlled  On Amlodipine and Labetalol . Now under control    Active Problems:    Acute respiratory failure with hypoxemia (HCC) POA: Unknown  Most likely from COPD and Asthma  Cont Reza; zabrina  Will add pulmicort inhaler  O2 to titrate at >92 % per RT protocol. Arrange for home oxygen and complete pulmonary function test as outpatient. Needs follow-up with his pulmonologist    Lung nodules 5 mm  Will need follow-up CT in 6-12 months and follow-up with pulmonologist      Dyslipidemia POA: Yes    Uncomplicated asthma POA: Yes      Morbid Obesity (BMI 30-39.9) POA: Yes  Counseled on loosing Weight    Possible obstructive sleep apnea with snoring and desaturation at night  Will need outpatient sleep study and follow-up with his pulmonologist      Leukopenia POA: Yes       Resolved Problems:    * No resolved hospital problems. *

## 2018-08-20 VITALS
TEMPERATURE: 97.2 F | BODY MASS INDEX: 32.45 KG/M2 | DIASTOLIC BLOOD PRESSURE: 91 MMHG | WEIGHT: 226.63 LBS | OXYGEN SATURATION: 88 % | SYSTOLIC BLOOD PRESSURE: 129 MMHG | HEART RATE: 108 BPM | RESPIRATION RATE: 16 BRPM | HEIGHT: 70 IN

## 2018-08-20 PROBLEM — J96.21 ACUTE ON CHRONIC RESPIRATORY FAILURE WITH HYPOXEMIA (HCC): Status: ACTIVE | Noted: 2018-08-18

## 2018-08-20 LAB
ALBUMIN SERPL BCP-MCNC: 3.7 G/DL (ref 3.2–4.9)
BASOPHILS # BLD AUTO: 0.4 % (ref 0–1.8)
BASOPHILS # BLD: 0.02 K/UL (ref 0–0.12)
BUN SERPL-MCNC: 18 MG/DL (ref 8–22)
CALCIUM SERPL-MCNC: 9.1 MG/DL (ref 8.5–10.5)
CHLORIDE SERPL-SCNC: 101 MMOL/L (ref 96–112)
CO2 SERPL-SCNC: 24 MMOL/L (ref 20–33)
CREAT SERPL-MCNC: 1.24 MG/DL (ref 0.5–1.4)
EOSINOPHIL # BLD AUTO: 0.39 K/UL (ref 0–0.51)
EOSINOPHIL NFR BLD: 8.3 % (ref 0–6.9)
ERYTHROCYTE [DISTWIDTH] IN BLOOD BY AUTOMATED COUNT: 37.8 FL (ref 35.9–50)
GLUCOSE SERPL-MCNC: 110 MG/DL (ref 65–99)
HCT VFR BLD AUTO: 50.1 % (ref 42–52)
HGB BLD-MCNC: 18.2 G/DL (ref 14–18)
IMM GRANULOCYTES # BLD AUTO: 0.01 K/UL (ref 0–0.11)
IMM GRANULOCYTES NFR BLD AUTO: 0.2 % (ref 0–0.9)
LYMPHOCYTES # BLD AUTO: 2.2 K/UL (ref 1–4.8)
LYMPHOCYTES NFR BLD: 47 % (ref 22–41)
MCH RBC QN AUTO: 33.7 PG (ref 27–33)
MCHC RBC AUTO-ENTMCNC: 36.3 G/DL (ref 33.7–35.3)
MCV RBC AUTO: 92.8 FL (ref 81.4–97.8)
MONOCYTES # BLD AUTO: 0.58 K/UL (ref 0–0.85)
MONOCYTES NFR BLD AUTO: 12.4 % (ref 0–13.4)
NEUTROPHILS # BLD AUTO: 1.48 K/UL (ref 1.82–7.42)
NEUTROPHILS NFR BLD: 31.7 % (ref 44–72)
NRBC # BLD AUTO: 0 K/UL
NRBC BLD-RTO: 0 /100 WBC
PHOSPHATE SERPL-MCNC: 5.3 MG/DL (ref 2.5–4.5)
PLATELET # BLD AUTO: 221 K/UL (ref 164–446)
PMV BLD AUTO: 10.6 FL (ref 9–12.9)
POTASSIUM SERPL-SCNC: 3.8 MMOL/L (ref 3.6–5.5)
RBC # BLD AUTO: 5.4 M/UL (ref 4.7–6.1)
SODIUM SERPL-SCNC: 134 MMOL/L (ref 135–145)
WBC # BLD AUTO: 4.7 K/UL (ref 4.8–10.8)

## 2018-08-20 PROCEDURE — 700111 HCHG RX REV CODE 636 W/ 250 OVERRIDE (IP): Performed by: HOSPITALIST

## 2018-08-20 PROCEDURE — 700102 HCHG RX REV CODE 250 W/ 637 OVERRIDE(OP): Performed by: HOSPITALIST

## 2018-08-20 PROCEDURE — 36415 COLL VENOUS BLD VENIPUNCTURE: CPT

## 2018-08-20 PROCEDURE — 700111 HCHG RX REV CODE 636 W/ 250 OVERRIDE (IP): Performed by: INTERNAL MEDICINE

## 2018-08-20 PROCEDURE — 94760 N-INVAS EAR/PLS OXIMETRY 1: CPT

## 2018-08-20 PROCEDURE — 85025 COMPLETE CBC W/AUTO DIFF WBC: CPT

## 2018-08-20 PROCEDURE — 94640 AIRWAY INHALATION TREATMENT: CPT

## 2018-08-20 PROCEDURE — A9270 NON-COVERED ITEM OR SERVICE: HCPCS | Performed by: INTERNAL MEDICINE

## 2018-08-20 PROCEDURE — A9270 NON-COVERED ITEM OR SERVICE: HCPCS | Performed by: HOSPITALIST

## 2018-08-20 PROCEDURE — 99239 HOSP IP/OBS DSCHRG MGMT >30: CPT | Performed by: INTERNAL MEDICINE

## 2018-08-20 PROCEDURE — 80069 RENAL FUNCTION PANEL: CPT

## 2018-08-20 PROCEDURE — 700102 HCHG RX REV CODE 250 W/ 637 OVERRIDE(OP): Performed by: INTERNAL MEDICINE

## 2018-08-20 RX ORDER — PRAVASTATIN SODIUM 20 MG
20 TABLET ORAL
Qty: 30 TAB | Refills: 1 | Status: SHIPPED | OUTPATIENT
Start: 2018-08-20

## 2018-08-20 RX ORDER — ALBUTEROL SULFATE 90 UG/1
2 AEROSOL, METERED RESPIRATORY (INHALATION) EVERY 6 HOURS PRN
Qty: 1 INHALER | Refills: 1 | Status: SHIPPED | OUTPATIENT
Start: 2018-08-20

## 2018-08-20 RX ORDER — HYDROCHLOROTHIAZIDE 25 MG/1
25 TABLET ORAL DAILY
Qty: 30 TAB | Refills: 1 | Status: SHIPPED | OUTPATIENT
Start: 2018-08-20

## 2018-08-20 RX ORDER — AMLODIPINE BESYLATE 10 MG/1
10 TABLET ORAL DAILY
Qty: 90 TAB | Refills: 0 | Status: SHIPPED | OUTPATIENT
Start: 2018-08-20

## 2018-08-20 RX ORDER — LISINOPRIL 20 MG/1
20 TABLET ORAL DAILY
Qty: 90 TAB | Refills: 0 | Status: SHIPPED | OUTPATIENT
Start: 2018-08-21

## 2018-08-20 RX ADMIN — ATORVASTATIN CALCIUM 40 MG: 40 TABLET, FILM COATED ORAL at 17:26

## 2018-08-20 RX ADMIN — AMLODIPINE BESYLATE 10 MG: 10 TABLET ORAL at 05:12

## 2018-08-20 RX ADMIN — HYDROCHLOROTHIAZIDE 25 MG: 25 TABLET ORAL at 05:12

## 2018-08-20 RX ADMIN — LISINOPRIL 20 MG: 20 TABLET ORAL at 05:12

## 2018-08-20 RX ADMIN — UMECLIDINIUM BROMIDE AND VILANTEROL TRIFENATATE 1 PUFF: 62.5; 25 POWDER RESPIRATORY (INHALATION) at 05:14

## 2018-08-20 RX ADMIN — ENOXAPARIN SODIUM 40 MG: 100 INJECTION SUBCUTANEOUS at 05:12

## 2018-08-20 RX ADMIN — BUDESONIDE 0.5 MG: 0.5 SUSPENSION RESPIRATORY (INHALATION) at 07:22

## 2018-08-20 RX ADMIN — BUDESONIDE 0.5 MG: 0.5 SUSPENSION RESPIRATORY (INHALATION) at 19:35

## 2018-08-20 ASSESSMENT — PAIN SCALES - GENERAL
PAINLEVEL_OUTOF10: 0

## 2018-08-20 ASSESSMENT — ENCOUNTER SYMPTOMS
ABDOMINAL PAIN: 0
ORTHOPNEA: 0
FEVER: 0
COUGH: 0
PALPITATIONS: 0
SINUS PAIN: 0
BLURRED VISION: 0
PND: 0
SPUTUM PRODUCTION: 0
CHILLS: 0
DIZZINESS: 0
SHORTNESS OF BREATH: 0

## 2018-08-20 NOTE — CARE PLAN
Problem: Safety  Goal: Will remain free from falls  Outcome: PROGRESSING AS EXPECTED  Pt up ad estrellita. Steady on his feet. Fall education provided.    Problem: Bowel/Gastric:  Goal: Normal bowel function is maintained or improved  Outcome: PROGRESSING AS EXPECTED

## 2018-08-20 NOTE — DISCHARGE PLANNING
Received Choice form at [phone]  Agency/Facility Name: [facility]  Referral sent per Choice form @ [phone]     Faxed Approved Service separately to [facility]

## 2018-08-20 NOTE — PROGRESS NOTES
Bedside report received. Patient resting comfortably in bed, no complaints at this time. 2L nasal cannula in place. Call light within reach.

## 2018-08-20 NOTE — PROGRESS NOTES
Subjective:      Pt is a 63 y.o. male who presents with Hypertension (pt states he is taking medication for it, but it still high)            HPI  Pt had come in earlier to the clinic to see the [facility] dept for clearance for a DOT recertification. His BP there was 210/108. He was urged to come to the  where his BP was 236/128 and then 226/120 in clinic. Pt states he is asymptomatic and is taking his BP meds but they don't appear to be working he notes. Pt has taken Rx medications for this condition: Lisinopril, Norvasc and HCTZ. Pt states the pain is a 0/10. Pt denies CP, SOB, NVD, paresthesias, headaches, dizziness, change in vision, hives, or other joint pain. The pt's medication list, problem list, and allergies have been evaluated and reviewed during today's visit.    PMH:  Past Medical History:   Diagnosis Date   • HTN (hypertension), benign 3/16/2012       PSH:  Negative per pt.      Fam Hx:    family history includes Hypertension in his mother.  Family Status   Relation Status   • Mo (Not Specified)   • Neg Hx (Not Specified)       Soc HX:  Social History     Social History   • Marital status:      Spouse name: N/A   • Number of children: N/A   • Years of education: N/A     Occupational History   • Not on file.     Social History Main Topics   • Smoking status: Former Smoker     Packs/day: 1.00     Years: 30.00     Types: Cigarettes     Quit date: 1/1/2006   • Smokeless tobacco: Never Used   • Alcohol use 0.0 oz/week      Comment: rare   • Drug use: No   • Sexual activity: Yes     Partners: Male     Other Topics Concern   • Not on file     Social History Narrative   • No narrative on file         Medications:  No current facility-administered medications for this visit.     Current Outpatient Prescriptions:   •  lisinopril (PRINIVIL) 10 MG Tab, Take 1 Tab by mouth every day., Disp: 90 Tab, Rfl: 1  •  amLODIPine (NORVASC) 10 MG Tab, Take 1 Tab by mouth every day., Disp: 90 Tab, Rfl: 0  •   hydroCHLOROthiazide (HYDRODIURIL) 25 MG Tab, Take 1 Tab by mouth every day., Disp: 30 Tab, Rfl: 1  •  albuterol 108 (90 Base) MCG/ACT Aero Soln inhalation aerosol, Inhale 2 Puffs by mouth every 6 hours as needed for Shortness of Breath., Disp: 1 Inhaler, Rfl: 1  •  pravastatin (PRAVACHOL) 20 MG Tab, Take 1 Tab by mouth every bedtime., Disp: 30 Tab, Rfl: 1  •  lisinopril-hydrochlorothiazide (PRINZIDE, ZESTORETIC) 20-12.5 MG per tablet, TAKE 1 TABLET BY MOUTH TWICE DAILY, Disp: 60 Tab, Rfl: 0  •  amLODIPine (NORVASC) 10 MG Tab, Take 1 Tab by mouth every day., Disp: 90 Tab, Rfl: 0  •  albuterol (VENTOLIN HFA) 108 (90 Base) MCG/ACT Aero Soln inhalation aerosol, Inhale 2 Puffs by mouth every four hours as needed. FOR SHORTNESS OF BREATH, Disp: 18 g, Rfl: 3  •  atorvastatin (LIPITOR) 40 MG Tab, Take 1 Tab by mouth every day., Disp: 30 Tab, Rfl: 6  •  tiotropium (SPIRIVA) 18 MCG Cap, Inhale 1 Cap by mouth every day., Disp: 30 Cap, Rfl: 3  •  ANORO ELLIPTA 62.5-25 MCG/INH AEROSOL POWDER, BREATH ACTIVATED inhaler, INHALE 1 PUFF BY MOUTH EVERY DAY, Disp: 1 Inhaler, Rfl: 0    Facility-Administered Medications Ordered in Other Visits:   •  lisinopril (PRINIVIL) tablet 20 mg, 20 mg, Oral, Q DAY, Gallito Szymanski D.O.  •  budesonide (PULMICORT) neb susp 0.5 mg, 0.5 mg, Nebulization, BID (RT), Florentin Xavier M.D., 0.5 mg at 08/19/18 1809  •  enoxaparin (LOVENOX) inj 40 mg, 40 mg, Subcutaneous, DAILY, Corrine Ahuja M.D., 40 mg at 08/19/18 0512  •  amLODIPine (NORVASC) tablet 10 mg, 10 mg, Oral, Q DAY, Corrine Ahuja M.D., 10 mg at 08/19/18 0512  •  hydroCHLOROthiazide (HYDRODIURIL) tablet 25 mg, 25 mg, Oral, Q DAY, Corrine Ahuja M.D., 25 mg at 08/19/18 0512  •  atorvastatin (LIPITOR) tablet 40 mg, 40 mg, Oral, Q EVENING, Gallito Szymanski D.O., 40 mg at 08/19/18 1822  •  albuterol inhaler 2 Puff, 2 Puff, Inhalation, Q6HRS PRN, Corrine Ahuja M.D.  •  umeclidinium-vilanterol (ANORO ELLIPTA) inhaler 1 Puff, 1 Puff,  Inhalation, DAILY, Corrine Ahuja M.D., 1 Puff at 08/19/18 0600  •  senna-docusate (PERICOLACE or SENOKOT S) 8.6-50 MG per tablet 2 Tab, 2 Tab, Oral, BID **AND** polyethylene glycol/lytes (MIRALAX) PACKET 1 Packet, 1 Packet, Oral, QDAY PRN **AND** magnesium hydroxide (MILK OF MAGNESIA) suspension 30 mL, 30 mL, Oral, QDAY PRN **AND** bisacodyl (DULCOLAX) suppository 10 mg, 10 mg, Rectal, QDAY PRN, Corrine Ahuja M.D.  •  Respiratory Care per Protocol, , Nebulization, Continuous RT, Corrine Ahuja M.D.  •  ondansetron (ZOFRAN) syringe/vial injection 4 mg, 4 mg, Intravenous, Q4HRS PRN, Corrine Ahuja M.D.  •  ondansetron (ZOFRAN ODT) dispertab 4 mg, 4 mg, Oral, Q4HRS PRN, Corrine Ahuja M.D.  •  promethazine (PHENERGAN) tablet 12.5-25 mg, 12.5-25 mg, Oral, Q4HRS PRN, Corrine Ahuja M.D.  •  promethazine (PHENERGAN) suppository 12.5-25 mg, 12.5-25 mg, Rectal, Q4HRS PRN, Corrine Ahuja M.D.  •  prochlorperazine (COMPAZINE) injection 5-10 mg, 5-10 mg, Intravenous, Q4HRS PRN, Corrine Ahuja M.D.  •  labetalol (NORMODYNE,TRANDATE) injection 10 mg, 10 mg, Intravenous, Q4HRS PRN, Corrine Ahuja M.D., 10 mg at 08/18/18 0250  •  acetaminophen (TYLENOL) tablet 650 mg, 650 mg, Oral, Q6HRS PRN, Corrine Ahuja M.D.  •  hydrALAZINE (APRESOLINE) injection 10 mg, 10 mg, Intravenous, Q6HRS PRN, Corrine Ahuja M.D.      Allergies:  Patient has no known allergies.    ROS  Constitutional: Negative for fever, chills and malaise/fatigue.   HENT: Negative for congestion and sore throat.    Eyes: Negative for blurred vision, double vision and photophobia.   Respiratory: Negative for cough and shortness of breath.  Cardiovascular: Negative for chest pain and palpitations.   Gastrointestinal: Negative for heartburn, nausea, vomiting, abdominal pain, diarrhea and constipation.   Genitourinary: Negative for dysuria and flank pain.   Musculoskeletal: Negative for joint pain and myalgias.   Skin: Negative  "for itching and rash.   Neurological: Negative for dizziness, tingling and headaches.   Endo/Heme/Allergies: Does not bruise/bleed easily.   Psychiatric/Behavioral: Negative for depression. The patient is not nervous/anxious.           Objective:     BP (!) 226/120   Pulse (!) 105   Temp 37.1 °C (98.7 °F)   Resp 20   Ht 1.778 m (5' 10\")   Wt 104.3 kg (230 lb)   SpO2 89%   BMI 33.00 kg/m²      Physical Exam      Constitutional: PT is oriented to person, place, and time. PT appears well-developed and well-nourished. No distress.   HENT:   Head: Normocephalic and atraumatic.   Mouth/Throat: Oropharynx is clear and moist. No oropharyngeal exudate.   Eyes: Conjunctivae normal and EOM are normal. Pupils are equal, round, and reactive to light.   Neck: Normal range of motion. Neck supple. No thyromegaly present.   Cardiovascular: Normal rate, regular rhythm, normal heart sounds and intact distal pulses.  Exam reveals no gallop and no friction rub.    No murmur heard.  Pulmonary/Chest: Effort normal and breath sounds normal. No respiratory distress. PT has no wheezes. PT has no rales. Pt exhibits no tenderness.   Abdominal: Soft. Bowel sounds are normal. PT exhibits no distension and no mass. There is no tenderness. There is no rebound and no guarding.   Musculoskeletal: Normal range of motion. PT exhibits no edema and no tenderness.   Neurological: PT is alert and oriented to person, place, and time. PT has normal reflexes. No cranial nerve deficit.   Skin: Skin is warm and dry. No rash noted. PT is not diaphoretic. No erythema.       Psychiatric: PT has a normal mood and affect. PT behavior is normal. Judgment and thought content normal.          Assessment/Plan:     1. Malignant hypertension- 226/120    - UC AMA/Refusal of Treatment-->defers EMS transport to ER  Pt with BP of 236/128 and then 226/120 in clinic  Pt states he does not have insurance and does not wish to go to the ER, but after 15-20 minutes of " counseling and encouraging the pt he was willing to go to be evaluated and treated.   TO [facility] ER NOW for further evaluation. Spoke with Dr. Mansfield on the phone, attending at the ER , and he graciously accepted transfer of care for further imaging and evaluation of the pt. Reiterated to patient that although a provider to provider transfer was made this will not necessarily expedite the ER process.  PT to go POV to ER now  Rest, fluids encouraged.  AVS with medical info given.  Pt was in full understanding and agreement with the plan.  Follow-up as needed if symptoms worsen or fail to improve.

## 2018-08-20 NOTE — DISCHARGE PLANNING
Anticipated Discharge Disposition: Home    Action: Per pfa pt is pending medicaid.      Barriers to Discharge: insurance, home o2    Plan: Home with home o2

## 2018-08-20 NOTE — DISCHARGE PLANNING
Anticipated Discharge Disposition: home    Action: Met with pt.  He has no health insurance and is not able to work at this time. He was previously working 2 hrs a day.  Wife works 4 hrs daily and monthly income approximately 1100.  This RN CM notified pfa to screen pt when wife is gets here after 12.      RN YANA requested out of pocket expenses for home O2 from [facility].  Initial set up is $155.  Then concentrator $125 monthly and tanks $10 each if pt picks up $25 if they need to be delivered.  This is not affordable for pt.      Barriers to Discharge: finances, no health insurance, medical clearance    Plan: follow up with pfa

## 2018-08-20 NOTE — PROGRESS NOTES
[facility]    Date of Service: 2018    Chief Complaint  63 y.o. male admitted 2018 with elevated bp during physical exam.    Interval Problem Update  :  Patient's bp is better controlled today.  However, patient o2sat goes to the low 80's during ambulation.   Patient reports diagnosis of asthma by outpatient pulmonologist.  CT angio chest is negative for pulmonary embolism.    :  Patient reports breathing okay.  However, I tested patient oxygen at rest can range from 88 to 93%.   During sleep, the patient's o2 sat decreased below 88% per nursing staff.   Consult pulmonologist.  :  Arrange home oxygen.  Patient reports that he is breathing fine.  He may be discharged today if oxygen is available.    Consultants/Specialty  Pulmonologist    Disposition   home with home oxygen.        Review of Systems   Constitutional: Negative for chills and fever.   HENT: Negative for congestion and sinus pain.    Eyes: Negative for blurred vision.   Respiratory: Negative for cough, sputum production and shortness of breath.    Cardiovascular: Negative for chest pain, palpitations, orthopnea, leg swelling and PND.   Gastrointestinal: Negative for abdominal pain.   Genitourinary: Negative for dysuria.   Skin: Negative for itching and rash.   Neurological: Negative for dizziness.      Physical Exam  Laboratory/Imaging   Hemodynamics  Temp (24hrs), Av.7 °C (98.1 °F), Min:36.4 °C (97.5 °F), Max:37.2 °C (98.9 °F)   Temperature: 36.6 °C (97.9 °F)  Pulse  Av.1  Min: 70  Max: 105    Blood Pressure: 126/88      Respiratory      Respiration: 18, Pulse Oximetry: 94 %, O2 Daily Delivery Respiratory : Silicone Nasal Cannula     Given By:: Mouthpiece, Work Of Breathing / Effort: Mild  RUL Breath Sounds: Clear, RML Breath Sounds: Clear, RLL Breath Sounds: Diminished, FRAN Breath Sounds: Clear, LLL Breath Sounds: Diminished    Fluids    Intake/Output Summary (Last 24 hours) at 18 1307  Last  data filed at 08/20/18 0000   Gross per 24 hour   Intake              960 ml   Output              450 ml   Net              510 ml       Nutrition  Orders Placed This Encounter   Procedures   • Diet Order Regular     Standing Status:   Standing     Number of Occurrences:   1     Order Specific Question:   Diet:     Answer:   Regular [1]     Physical Exam   Constitutional: He is oriented to person, place, and time. No distress.   HENT:   Head: Normocephalic.   Mouth/Throat: Oropharynx is clear and moist. No oropharyngeal exudate.   Eyes: Pupils are equal, round, and reactive to light. Right eye exhibits no discharge. Left eye exhibits no discharge. No scleral icterus.   Neck: Neck supple. No JVD present.   Cardiovascular: Normal rate and regular rhythm.    No murmur heard.  Pulmonary/Chest: Effort normal and breath sounds normal. No respiratory distress. He has no wheezes.   Abdominal: Soft. Bowel sounds are normal. He exhibits no distension.   Musculoskeletal: Normal range of motion. He exhibits no edema.   Neurological: He is alert and oriented to person, place, and time. No cranial nerve deficit.   Skin: Skin is warm and dry. He is not diaphoretic.   Psychiatric: He has a normal mood and affect.       Recent Labs      08/18/18   0150  08/19/18   0257  08/20/18   0239   WBC  4.3*  4.2*  4.7*   RBC  5.31  5.59  5.40   HEMOGLOBIN  17.7  18.3*  18.2*   HEMATOCRIT  49.3  51.8  50.1   MCV  92.8  92.7  92.8   MCH  33.3*  32.7  33.7*   MCHC  35.9*  35.3  36.3*   RDW  37.8  37.5  37.8   PLATELETCT  223  218  221   MPV  10.5  10.5  10.6     Recent Labs      08/18/18   0150  08/19/18   0257  08/20/18   0237   SODIUM  137  140  134*   POTASSIUM  3.9  3.7  3.8   CHLORIDE  104  102  101   CO2  27  28  24   GLUCOSE  106*  107*  110*   BUN  10  13  18   CREATININE  0.98  1.05  1.24   CALCIUM  9.0  9.2  9.1     Recent Labs      08/17/18   1418   APTT  31.3   INR  1.09     Recent Labs      08/17/18   1418   BNPBTYPENAT  128*      Recent Labs      08/18/18   0150   TRIGLYCERIDE  80   HDL  49   LDL  122*          Assessment/Plan     * Hypertensive urgency- (present on admission)   Assessment & Plan    Hypertensive 2/2 medication noncompliance 2/2 lack of insurance coverage.  - SW consultation  - increase lisinopril to 20 mg for better bp control. Continue HCTZ and amlodipine.  -- discussed with patient that [facility]  Patient expressed understanding.  - IV hydralazine, labetalol PRN        Acute on chronic respiratory failure with hypoxemia (HCC)   Assessment & Plan    Resting room air oxygen saturation is in the low 90s.  Drop to low 80's during ambulation.  There is no improvement despite breathing treatment. Consult pulmonologist.    Order home oxygen. Mild cardiomegaly on imaging. Echocardiogram indicates Left ventricular ejection fraction is visually estimated to be 55%.  No significant valve disease or flow abnormalities.   -- hypoxemia likely a chronic issue for this patient as he has emphysema         Multiple pulmonary nodules   Assessment & Plan    CT angio chest found Small bilateral pulmonary nodules which measure up to 5 mm in size  High Risk - History of smoking or of other known risk factors.  Per Fleischner Society 2017 Guidelines for Management of Incidentally Detected Pulmonary Nodules in Adults, repeat CT chest at 12 months        Leukopenia- (present on admission)   Assessment & Plan    Baseline unknown but WBC fluctuate in narrow range. No evidence of infection at this time.  - recommend outpatient follow up        Obesity (BMI 30-39.9)- (present on admission)   Assessment & Plan    Body mass index is 33.56 kg/m².   - continued counseling and outpatient follow up        Pulmonary emphysema (HCC)- (present on admission)   Assessment & Plan    No wheezes on examination.  CTA chest actually indicates emphysema which is compatible with the patient's history tobacco  smoking.   Suspect the patient has copd instead of asthma.    Continue breathing treatment.         Dyslipidemia- (present on admission)   Assessment & Plan    LDL is 120  -- continue statin therapy.          Quality-Core Measures   Reviewed items::  Labs reviewed and Medications reviewed  DVT prophylaxis pharmacological::  Enoxaparin (Lovenox)

## 2018-08-20 NOTE — DISCHARGE PLANNING
Anticipated Discharge Disposition: home    Action: Leadership approved cost for 1 mo of O2.  Choice form and copy of approved services faxed to [facility].  Pt notified and agrees with plan.    Barriers to Discharge: Home O2    Plan: Home

## 2018-08-20 NOTE — PROGRESS NOTES
Called patient financial services per request of patient and social work to discuss insurance options with patient and wife.

## 2018-08-20 NOTE — PROGRESS NOTES
[facility] Progress Note    Date of Service: 2018    Chief Complaint  63 y.o. male admitted 2018 with elevated bp during physical exam.    Interval Problem Update  :  Patient's bp is better controlled today.  However, patient o2sat goes to the low 80's during ambulation.   Patient reports diagnosis of asthma by outpatient pulmonologist.  CT angio chest is negative for pulmonary embolism.    :  Patient reports breathing okay.  However, I tested patient oxygen at rest can range from 88 to 93%.   During sleep, the patient's o2 sat decreased below 88% per nursing staff.   Consult pulmonologist.    Consultants/Specialty  Pulmonologist    Disposition   home with home oxygen.        Review of Systems   Constitutional: Negative for chills and fever.   HENT: Negative for congestion and sinus pain.    Eyes: Negative for blurred vision.   Respiratory: Negative for cough, sputum production and shortness of breath.    Cardiovascular: Negative for chest pain, palpitations, orthopnea, leg swelling and PND.   Gastrointestinal: Negative for abdominal pain.   Genitourinary: Negative for dysuria.   Skin: Negative for itching and rash.   Neurological: Negative for dizziness.      Physical Exam  Laboratory/Imaging   Hemodynamics  Temp (24hrs), Av.8 °C (98.3 °F), Min:36.4 °C (97.5 °F), Max:37.3 °C (99.2 °F)   Temperature: 36.4 °C (97.5 °F)  Pulse  Av.8  Min: 70  Max: 105    Blood Pressure: 140/83      Respiratory      Respiration: 16, Pulse Oximetry: 94 %, O2 Daily Delivery Respiratory : Silicone Nasal Cannula     Given By:: Mouthpiece, Work Of Breathing / Effort: Mild  RUL Breath Sounds: Clear, RML Breath Sounds: Clear, RLL Breath Sounds: Diminished, FRAN Breath Sounds: Clear, LLL Breath Sounds: Diminished    Fluids    Intake/Output Summary (Last 24 hours) at 18 192  Last data filed at 18 1300   Gross per 24 hour   Intake              780 ml   Output                0 ml   Net               780 ml       Nutrition  Orders Placed This Encounter   Procedures   • Diet Order Regular     Standing Status:   Standing     Number of Occurrences:   1     Order Specific Question:   Diet:     Answer:   Regular [1]     Physical Exam   Constitutional: He is oriented to person, place, and time. No distress.   HENT:   Head: Normocephalic.   Mouth/Throat: Oropharynx is clear and moist. No oropharyngeal exudate.   Eyes: Pupils are equal, round, and reactive to light. Right eye exhibits no discharge. Left eye exhibits no discharge. No scleral icterus.   Neck: Neck supple. No JVD present.   Cardiovascular: Normal rate and regular rhythm.    No murmur heard.  Pulmonary/Chest: Effort normal and breath sounds normal. No respiratory distress. He has no wheezes.   Abdominal: Soft. Bowel sounds are normal. He exhibits no distension.   Musculoskeletal: Normal range of motion. He exhibits no edema.   Neurological: He is alert and oriented to person, place, and time. No cranial nerve deficit.   Skin: Skin is warm and dry. He is not diaphoretic.   Psychiatric: He has a normal mood and affect.       Recent Labs      08/17/18   1418  08/18/18   0150  08/19/18   0257   WBC  4.1*  4.3*  4.2*   RBC  5.23  5.31  5.59   HEMOGLOBIN  17.8  17.7  18.3*   HEMATOCRIT  48.5  49.3  51.8   MCV  92.7  92.8  92.7   MCH  34.0*  33.3*  32.7   MCHC  36.7*  35.9*  35.3   RDW  37.9  37.8  37.5   PLATELETCT  206  223  218   MPV  10.5  10.5  10.5     Recent Labs      08/17/18   1418  08/18/18   0150  08/19/18   0257   SODIUM  140  137  140   POTASSIUM  3.9  3.9  3.7   CHLORIDE  105  104  102   CO2  22  27  28   GLUCOSE  99  106*  107*   BUN  9  10  13   CREATININE  0.94  0.98  1.05   CALCIUM  9.3  9.0  9.2     Recent Labs      08/17/18   1418   APTT  31.3   INR  1.09     Recent Labs      08/17/18   1418   BNPBTYPENAT  128*     Recent Labs      08/18/18   0150   TRIGLYCERIDE  80   HDL  49   LDL  122*          Assessment/Plan     * Hypertensive urgency-  (present on admission)   Assessment & Plan    Hypertensive 2/2 medication noncompliance 2/2 lack of insurance coverage.  -  consultation  - increase lisinopril to 20 mg for better bp control. Continue HCTZ and amlodipine.  -- discussed with patient that these medication can be obtain from the [facility] at low cost.  Patient expressed understanding.  - IV hydralazine, labetalol PRN        Acute respiratory failure with hypoxemia (HCC)   Assessment & Plan    Resting room air oxygen saturation is in the low 90s.  Drop to low 80's during ambulation.  There is no improvement despite breathing treatment. Consult pulmonologist.    Order home oxygen. Mild cardiomegaly on imaging.  Check echocardiogram.        Multiple pulmonary nodules   Assessment & Plan    CT angio chest found Small bilateral pulmonary nodules which measure up to 5 mm in size  High Risk - History of smoking or of other known risk factors.  Per Fleischner Society 2017 Guidelines for Management of Incidentally Detected Pulmonary Nodules in Adults, repeat CT chest at 12 months        Leukopenia- (present on admission)   Assessment & Plan    Baseline unknown but WBC fluctuate in narrow range. No evidence of infection at this time.  - recommend outpatient follow up        Obesity (BMI 30-39.9)- (present on admission)   Assessment & Plan    Body mass index is 33.56 kg/m².   - continued counseling and outpatient follow up        Pulmonary emphysema (HCC)- (present on admission)   Assessment & Plan    No wheezes on examination.  CTA chest actually indicates emphysema which is compatible with the patient's history tobacco smoking.   Suspect the patient has copd instead of asthma.    Continue breathing treatment.         Dyslipidemia- (present on admission)   Assessment & Plan    LDL is 120  -- continue statin therapy.          Quality-Core Measures   Reviewed items::  Labs reviewed and Medications reviewed  DVT prophylaxis pharmacological::  Enoxaparin (Lovenox)

## 2018-08-21 ENCOUNTER — PATIENT OUTREACH (OUTPATIENT)
Dept: HEALTH INFORMATION MANAGEMENT | Facility: OTHER | Age: 63
End: 2018-08-21

## 2018-08-21 NOTE — DISCHARGE SUMMARY
Discharge Summary    CHIEF COMPLAINT ON ADMISSION  Chief Complaint   Patient presents with   • Hypertension   • Medication Refill     ran out of htn medication        Reason for Admission  Sent by Urgent Care     Admission Date  8/17/2018    CODE STATUS  DNAR/DNI    HPI & HOSPITAL COURSE  This is a 63 y.o. male here with elevated bp during physical exam.  Patient is not compliant with antihypertensive medication because he lacks medical insurance.   In hospital, the patient's blood pressure is brought under controlled by resuming home antihypertensive medications and titrated up lisinopril dosage.  Then patient is found to have hypoxemia on arrival.   Subsequently, a CTA of chest was done.  CTA chest indicates no pulmonary embolism but there is emphysema which is consistent with patient's history of tobacco smoking. Given patient has no prior history of hypoxemia.  I consulted pulmonologist to help with management of this patient.  Despite aggressive treatment, the patient still remains hypoxic during ambulation .  Therefore, home oxygen is ordered.  We also recommended that the patient should have outpatient sleep study done as we suspect that the patient may have obstructive sleep apnea.          Therefore, he is discharged in fair and stable condition to home with close outpatient follow-up.    The patient met 2-midnight criteria for an inpatient stay at the time of discharge.    Discharge Date  8/20/2018    FOLLOW UP ITEMS POST DISCHARGE  Recommend repeat CT chest in 12 months to monitor pulmonary nodules.    DISCHARGE DIAGNOSES  Principal Problem:    Hypertensive urgency POA: Yes  Active Problems:    Acute on chronic respiratory failure with hypoxemia (HCC) POA: Unknown    Dyslipidemia POA: Yes    Pulmonary emphysema (HCC) POA: Yes    Obesity (BMI 30-39.9) POA: Yes    Leukopenia POA: Yes    Multiple pulmonary nodules POA: Unknown  Resolved Problems:    * No resolved hospital problems. *      FOLLOW UP  No  future appointments.  [facility], Emergency Dept  [address]  [place] [address]  [phone]    If symptoms worsen        Schedule an appointment as soon as possible for a visit  your doctor    Laura Rodriguez M.D.  [address]  [address]  [place] [address]  [phone]    In 3 weeks  pulmonologist for pulmonary function testing.      MEDICATIONS ON DISCHARGE     Medication List      START taking these medications      Instructions   hydroCHLOROthiazide 25 MG Tabs  Commonly known as:  HYDRODIURIL   Take 1 Tab by mouth every day.  Dose:  25 mg     lisinopril 20 MG Tabs  Commonly known as:  PRINIVIL   Take 1 Tab by mouth every day.  Dose:  20 mg     pravastatin 20 MG Tabs  Commonly known as:  PRAVACHOL   Take 1 Tab by mouth every bedtime.  Dose:  20 mg        CHANGE how you take these medications      Instructions   * amLODIPine 10 MG Tabs  What changed:  Another medication with the same name was added. Make sure you understand how and when to take each.  Commonly known as:  NORVASC   Take 1 Tab by mouth every day.  Dose:  10 mg     * amLODIPine 10 MG Tabs  What changed:  You were already taking a medication with the same name, and this prescription was added. Make sure you understand how and when to take each.  Commonly known as:  NORVASC   Take 1 Tab by mouth every day.  Dose:  10 mg        * This list has 2 medication(s) that are the same as other medications prescribed for you. Read the directions carefully, and ask your doctor or other care provider to review them with you.            CONTINUE taking these medications      Instructions   * albuterol 108 (90 Base) MCG/ACT Aers inhalation aerosol  Commonly known as:  VENTOLIN HFA   Inhale 2 Puffs by mouth every four hours as needed. FOR SHORTNESS OF BREATH  Dose:  2 Puff     * albuterol 108 (90 Base) MCG/ACT Aers inhalation aerosol   Inhale 2 Puffs by mouth every 6 hours as needed for Shortness of Breath.  Dose:  2 Puff     * ANORO  ELLIPTA 62.5-25 MCG/INH Aepb inhaler  Generic drug:  umeclidinium-vilanterol   Inhale 1 Puff by mouth every day.  Dose:  1 Puff     * ANORO ELLIPTA 62.5-25 MCG/INH Aepb inhaler  Generic drug:  umeclidinium-vilanterol   INHALE 1 PUFF BY MOUTH EVERY DAY     lisinopril-hydrochlorothiazide 20-12.5 MG per tablet  Commonly known as:  ZAN MITTAL   Doctor's comments:  This is the last refill before patient is seen. Please inform patient.  TAKE 1 TABLET BY MOUTH TWICE DAILY     tiotropium 18 MCG Caps  Commonly known as:  SPIRIVA   Inhale 1 Cap by mouth every day.  Dose:  18 mcg        * This list has 4 medication(s) that are the same as other medications prescribed for you. Read the directions carefully, and ask your doctor or other care provider to review them with you.            STOP taking these medications    atorvastatin 40 MG Tabs  Commonly known as:  LIPITOR            Allergies  No Known Allergies    DIET  Orders Placed This Encounter   Procedures   • Diet Order Regular     Standing Status:   Standing     Number of Occurrences:   1     Order Specific Question:   Diet:     Answer:   Regular [1]       ACTIVITY  As tolerated.      CONSULTATIONS  Pulmonologist    PROCEDURES  None.    LABORATORY  Lab Results   Component Value Date    SODIUM 134 (L) 08/20/2018    POTASSIUM 3.8 08/20/2018    CHLORIDE 101 08/20/2018    CO2 24 08/20/2018    GLUCOSE 110 (H) 08/20/2018    BUN 18 08/20/2018    CREATININE 1.24 08/20/2018        Lab Results   Component Value Date    WBC 4.7 (L) 08/20/2018    HEMOGLOBIN 18.2 (H) 08/20/2018    HEMATOCRIT 50.1 08/20/2018    PLATELETCT 221 08/20/2018      Physical Exam   Constitutional: He is oriented to person, place, and time. No distress.   HENT:   Head: Normocephalic.   Mouth/Throat: Oropharynx is clear and moist. No oropharyngeal exudate.   Eyes: Pupils are equal, round, and reactive to light. Right eye exhibits no discharge. Left eye exhibits no discharge. No scleral icterus.   Neck:  Neck supple. No JVD present.   Cardiovascular: Normal rate and regular rhythm.    No murmur heard.  Pulmonary/Chest: Effort normal and breath sounds normal. No respiratory distress. He has no wheezes.   Abdominal: Soft. Bowel sounds are normal. He exhibits no distension.   Musculoskeletal: Normal range of motion. He exhibits no edema.   Neurological: He is alert and oriented to person, place, and time. No cranial nerve deficit.   Skin: Skin is warm and dry. He is not diaphoretic.   Psychiatric: He has a normal mood and affect.     Total time of the discharge process exceeds 32 minutes.

## 2018-08-21 NOTE — PROGRESS NOTES
Plan for discharge home tonight pending home oxygen set-up per SW. Called on call SW to check on status of home oxygen.

## 2018-08-21 NOTE — PROGRESS NOTES
"Pulmonary Progress Note    Patient ID:   Name:             Zander Domínguez   YOB: 1955  Age:                 63 y.o.  male   MRN:               [id number]                                                  Subjective: Patient thinks his shortness of breath is at baseline.  He is supplementation using oxygen.  Was not on oxygen at home.    Interval Changes:   Patient was started on Pulmicort yesterday.  He was taken anoro inhaler already.  Breathing at baseline as per the patient.  No adverse events overnight.  Afebrile.    Objective:   Vitals/ General Appearance:   Weight/BMI: Body mass index is 32.52 kg/m².  Blood pressure 114/92, pulse 100, temperature 36.5 °C (97.7 °F), resp. rate 20, height 1.778 m (5' 10\"), weight 102.8 kg (226 lb 10.1 oz), SpO2 91 %.  Vitals:    08/20/18 0400 08/20/18 0723 08/20/18 0830 08/20/18 1200   BP: 126/88  135/83 114/92   Pulse: 85 82 87 100   Resp: 18 18 18 20   Temp: 36.6 °C (97.9 °F)  36.8 °C (98.3 °F) 36.5 °C (97.7 °F)   SpO2: 92% 94% 92% 91%   Weight:       Height:         Oxygen Therapy:  Pulse Oximetry: 91 %, O2 (LPM): 2, O2 Delivery: Silicone Nasal Cannula    Constitutional:   Well developed, Well nourished, No acute distress  HENMT:  Normocephalic, Atraumatic, Oropharynx moist mucous membranes, No oral exudates, Nose normal.  No thyromegaly.  Eyes:  EOMI, Conjunctiva normal, No discharge.  Neck:  Normal range of motion, No cervical tenderness,  no JVD.  Cardiovascular:  Normal heart rate, Normal rhythm, No murmurs, No rubs, No gallops.   Extremitites with intact distal pulses, no cyanosis, or edema.  Lungs:  Normal breath sounds, breath sounds clear to auscultation bilaterally,  no crackles, no wheezing.   Abdomen: Bowel sounds normal, Soft, No tenderness, No guarding, No rebound, No masses, No hepatosplenomegaly.  Skin: Warm, Dry, No erythema, No rash, no induration.  Neurologic: Alert & oriented x 3, No focal deficits noted, cranial nerves II through X are " grossly intact.  Psychiatric: Affect normal, Judgment normal, Mood normal.    Labs:  Recent Labs      08/18/18   0150  08/19/18   0257  08/20/18   0239   WBC  4.3*  4.2*  4.7*   RBC  5.31  5.59  5.40   HEMOGLOBIN  17.7  18.3*  18.2*   HEMATOCRIT  49.3  51.8  50.1   MCV  92.8  92.7  92.8   MCH  33.3*  32.7  33.7*   MCHC  35.9*  35.3  36.3*   RDW  37.8  37.5  37.8   PLATELETCT  223  218  221   MPV  10.5  10.5  10.6                 Recent Labs      08/18/18   0150  08/19/18   0257  08/20/18   0237   SODIUM  137  140  134*   POTASSIUM  3.9  3.7  3.8   CHLORIDE  104  102  101   CO2  27  28  24   GLUCOSE  106*  107*  110*   BUN  10  13  18     Recent Labs      08/18/18   0150  08/19/18   0257  08/20/18   0237   SODIUM  137  140  134*   POTASSIUM  3.9  3.7  3.8   CHLORIDE  104  102  101   CO2  27  28  24   BUN  10  13  18   CREATININE  0.98  1.05  1.24   PHOSPHORUS   --   4.5  5.3*   CALCIUM  9.0  9.2  9.1     Results for orders placed or performed during the hospital encounter of 08/17/18   ECHOCARDIOGRAM COMP W/O CONT   Result Value Ref Range    Eject.Frac. MOD BP 51.55     Eject.Frac. MOD 4C 52.31     Eject.Frac. MOD 2C 52.78     Left Ventrical Ejection Fraction 55          Imaging:   ECHOCARDIOGRAM COMP W/O CONT   Final Result      CT-CTA CHEST PULMONARY ARTERY W/ RECONS   Final Result      1.  No evidence of pulmonary embolus.      2.  Small bilateral pulmonary nodules which measure up to 5 mm in size.      3.  Emphysematous change of the lungs.      Low Risk: No routine follow-up      High Risk: Optional CT at 12 months      Comments: Use most suspicious nodule as guide to management. Follow-up intervals may vary according to size and risk.      Low Risk - Minimal or absent history of smoking and of other known risk factors.      High Risk - History of smoking or of other known risk factors.      Note: These recommendations do not apply to lung cancer screening, patients with immunosuppression, or patients with known  primary cancer.      Fleischner Society 2017 Guidelines for Management of Incidentally Detected Pulmonary Nodules in Adults      DX-CHEST-PORTABLE (1 VIEW)   Final Result      No acute cardiopulmonary abnormality identified.          Hospital Medications:    Current Facility-Administered Medications:   •  lisinopril (PRINIVIL) tablet 20 mg, 20 mg, Oral, Q DAY, Gallito Szymanski D.O., 20 mg at 08/20/18 0512  •  budesonide (PULMICORT) neb susp 0.5 mg, 0.5 mg, Nebulization, BID (RT), Florentin Xavier M.D., 0.5 mg at 08/20/18 0722  •  enoxaparin (LOVENOX) inj 40 mg, 40 mg, Subcutaneous, DAILY, Corrine Ahuja M.D., 40 mg at 08/20/18 0512  •  amLODIPine (NORVASC) tablet 10 mg, 10 mg, Oral, Q DAY, Corrine Ahuja M.D., 10 mg at 08/20/18 0512  •  hydroCHLOROthiazide (HYDRODIURIL) tablet 25 mg, 25 mg, Oral, Q DAY, Corrine Ahuja M.D., 25 mg at 08/20/18 0512  •  atorvastatin (LIPITOR) tablet 40 mg, 40 mg, Oral, Q EVENING, Gallito Szymanski D.O., 40 mg at 08/19/18 1822  •  albuterol inhaler 2 Puff, 2 Puff, Inhalation, Q6HRS PRN, Corrine Ahuja M.D.  •  umeclidinium-vilanterol (ANORO ELLIPTA) inhaler 1 Puff, 1 Puff, Inhalation, DAILY, Corrine Ahuja M.D., 1 Puff at 08/20/18 0514  •  senna-docusate (PERICOLACE or SENOKOT S) 8.6-50 MG per tablet 2 Tab, 2 Tab, Oral, BID, Stopped at 08/20/18 0600 **AND** polyethylene glycol/lytes (MIRALAX) PACKET 1 Packet, 1 Packet, Oral, QDAY PRN **AND** magnesium hydroxide (MILK OF MAGNESIA) suspension 30 mL, 30 mL, Oral, QDAY PRN **AND** bisacodyl (DULCOLAX) suppository 10 mg, 10 mg, Rectal, QDAY PRN, Corrine Ahuja M.D.  •  Respiratory Care per Protocol, , Nebulization, Continuous RT, Corrine Ahuja M.D.  •  ondansetron (ZOFRAN) syringe/vial injection 4 mg, 4 mg, Intravenous, Q4HRS PRN, Corrine Ahuja M.D.  •  ondansetron (ZOFRAN ODT) dispertab 4 mg, 4 mg, Oral, Q4HRS PRN, Corrine Ahuja M.D.  •  promethazine (PHENERGAN) tablet 12.5-25 mg, 12.5-25 mg, Oral, Q4HRS PRN,  Corrine Ahuja M.D.  •  promethazine (PHENERGAN) suppository 12.5-25 mg, 12.5-25 mg, Rectal, Q4HRS PRN, Corrine Ahuja M.D.  •  prochlorperazine (COMPAZINE) injection 5-10 mg, 5-10 mg, Intravenous, Q4HRS PRN, Corrine Ahuja M.D.  •  labetalol (NORMODYNE,TRANDATE) injection 10 mg, 10 mg, Intravenous, Q4HRS PRN, Corrine Ahuja M.D., 10 mg at 08/18/18 0250  •  acetaminophen (TYLENOL) tablet 650 mg, 650 mg, Oral, Q6HRS PRN, Corrine Ahuja M.D.  •  hydrALAZINE (APRESOLINE) injection 10 mg, 10 mg, Intravenous, Q6HRS PRN, Corrine Ahuja M.D.    Current Outpatient Medications:  Prescriptions Prior to Admission   Medication Sig Dispense Refill Last Dose   • umeclidinium-vilanterol (ANORO ELLIPTA) 62.5-25 MCG/INH AEROSOL POWDER, BREATH ACTIVATED inhaler Inhale 1 Puff by mouth every day.   8/17/2018 at 0700       Medication Allergy:  No Known Allergies    Assessment and Plan:  Acute respiratory failure with hypoxemia (HCC)  -History of asthma/COPD  -Started on Pulmicort yesterday.  He was already on anoro  -Breathing at baseline this morning.  Denies any shortness of breath.   -Continue current medications.  -Continue nebulizers on as-needed basis  -Patient will need pulmonary function tested and pulmonary follow-up after discharge.  -Continue oxygen supplementation to keep O2 saturation around 92%.  Patient may need oxygen supplementation upon discharge       Lung nodules 5 mm  -Will need follow-up CT in 6-12 months and follow-up with pulmonologist    Hypertensive urgency POA: Yes; now better controlled  -On Amlodipine and Labetalol . Now under control        Morbid Obesity (BMI 30-39.9) POA: Yes  Counseled on loosing Weight     Possible obstructive sleep apnea with snoring and desaturation at night  -Will need outpatient sleep study

## 2018-08-21 NOTE — PROGRESS NOTES
Patient discharged to home via car with wife. Discharge paperwork was discussed with the patient. LDAs were removed. Tele monitor disconnected. Pt prescriptions explained to pt. Patient escorted out in wheelchair.

## 2018-08-21 NOTE — PROGRESS NOTES
Received Bedside report. Assumed care at 1900. This pt is AOx4, ambulatory with SBA, voiding appropriately, denies pain. Patient and RN discussed plan of care: questions answered. Labs noted, assessment complete, patient tolerating regular diet. Tele box in place. Pt is on 2L of O2 via NC. Call light in place, fall precautions in place, patient educated on importance of calling for assistance. No additional needs at this time. VSS.

## 2018-08-21 NOTE — DISCHARGE PLANNING
Agency/Facility Name: [facility]  Spoke To: Gael  Outcome: Per Gael oxygen should be delivered by 1900 today.  JALEN So has been advised.

## 2018-08-21 NOTE — DISCHARGE INSTRUCTIONS
Discharge Instructions    Discharged to home by car with relative. Discharged via wheelchair, hospital escort: Yes.  Special equipment needed: Oxygen    Be sure to schedule a follow-up appointment with your primary care doctor or any specialists as instructed.     Discharge Plan:   Diet Plan: Discussed  Activity Level: Discussed  Confirmed Follow up Appointment: Patient to Call and Schedule Appointment  Confirmed Symptoms Management: Discussed  Medication Reconciliation Updated: Yes  Influenza Vaccine Indication: Patient Refuses    I understand that a diet low in cholesterol, fat, and sodium is recommended for good health. Unless I have been given specific instructions below for another diet, I accept this instruction as my diet prescription.   Other diet: Regular    Special Instructions: None    · Is patient discharged on Warfarin / Coumadin?   No     Depression / Suicide Risk    As you are discharged from this [facility] facility, it is important to learn how to keep safe from harming yourself.    Recognize the warning signs:  · Abrupt changes in personality, positive or negative- including increase in energy   · Giving away possessions  · Change in eating patterns- significant weight changes-  positive or negative  · Change in sleeping patterns- unable to sleep or sleeping all the time   · Unwillingness or inability to communicate  · Depression  · Unusual sadness, discouragement and loneliness  · Talk of wanting to die  · Neglect of personal appearance   · Rebelliousness- reckless behavior  · Withdrawal from people/activities they love  · Confusion- inability to concentrate     If you or a loved one observes any of these behaviors or has concerns about self-harm, here's what you can do:  · Talk about it- your feelings and reasons for harming yourself  · Remove any means that you might use to hurt yourself (examples: pills, rope, extension cords, firearm)  · Get professional help from the community (Mental  Health, Substance Abuse, psychological counseling)  · Do not be alone:Call your Safe Contact- someone whom you trust who will be there for you.  · Call your local CRISIS HOTLINE 459-4946 or 106-850-3760  · Call your local Children's Mobile Crisis Response Team [place] (345) 912-6217 or www.[employer]  · Call the toll free National Suicide Prevention Hotlines   · National Suicide Prevention Lifeline 211-113-ZAOQ (4452)  · [place] Line Network 800-SUICIDE (183-0220)        Arterial Hypertension  Arterial hypertension (high blood pressure) is a condition of elevated pressure in your blood vessels. Hypertension over a long period of time is a risk factor for strokes, heart attacks, and heart failure. It is also the leading cause of kidney (renal) failure.   CAUSES   · In Adults -- Over 90% of all hypertension has no known cause. This is called essential or primary hypertension. In the other 10% of people with hypertension, the increase in blood pressure is caused by another disorder. This is called secondary hypertension. Important causes of secondary hypertension are:  · Heavy alcohol use.  · Obstructive sleep apnea.  · Hyperaldosterosim (Conn's syndrome).  · Steroid use.  · Chronic kidney failure.  · Hyperparathyroidism.  · Medications.  · Renal artery stenosis.  · Pheochromocytoma.  · Cushing's disease.  · Coarctation of the aorta.  · Scleroderma renal crisis.  · Licorice (in excessive amounts).  · Drugs (cocaine, methamphetamine).  Your caregiver can explain any items above that apply to you.  · In Children -- Secondary hypertension is more common and should always be considered.  · Pregnancy -- Few women of childbearing age have high blood pressure. However, up to 10% of them develop hypertension of pregnancy. Generally, this will not harm the woman. It may be a sign of 3 complications of pregnancy: preeclampsia, HELLP syndrome, and eclampsia. Follow up and control with medication is  "necessary.  SYMPTOMS   · This condition normally does not produce any noticeable symptoms. It is usually found during a routine exam.  · Malignant hypertension is a late problem of high blood pressure. It may have the following symptoms:  · Headaches.  · Blurred vision.  · End-organ damage (this means your kidneys, heart, lungs, and other organs are being damaged).  · Stressful situations can increase the blood pressure. If a person with normal blood pressure has their blood pressure go up while being seen by their caregiver, this is often termed \"white coat hypertension.\" Its importance is not known. It may be related with eventually developing hypertension or complications of hypertension.  · Hypertension is often confused with mental tension, stress, and anxiety.  DIAGNOSIS   The diagnosis is made by 3 separate blood pressure measurements. They are taken at least 1 week apart from each other. If there is organ damage from hypertension, the diagnosis may be made without repeat measurements.  Hypertension is usually identified by having blood pressure readings:  · Above 140/90 mmHg measured in both arms, at 3 separate times, over a couple weeks.  · Over 130/80 mmHg should be considered a risk factor and may require treatment in patients with diabetes.  Blood pressure readings over 120/80 mmHg are called \"pre-hypertension\" even in non-diabetic patients.  To get a true blood pressure measurement, use the following guidelines. Be aware of the factors that can alter blood pressure readings.  · Take measurements at least 1 hour after caffeine.  · Take measurements 30 minutes after smoking and without any stress. This is another reason to quit smoking  it raises your blood pressure.  · Use a proper cuff size. Ask your caregiver if you are not sure about your cuff size.  · Most home blood pressure cuffs are automatic. They will measure systolic and diastolic pressures. The systolic pressure is the pressure reading at the " "start of sounds. Diastolic pressure is the pressure at which the sounds disappear. If you are elderly, measure pressures in multiple postures. Try sitting, lying or standing.  · Sit at rest for a minimum of 5 minutes before taking measurements.  · You should not be on any medications like decongestants. These are found in many cold medications.  · Record your blood pressure readings and review them with your caregiver.  If you have hypertension:  · Your caregiver may do tests to be sure you do not have secondary hypertension (see \"causes\" above).  · Your caregiver may also look for signs of metabolic syndrome. This is also called Syndrome X or Insulin Resistance Syndrome. You may have this syndrome if you have type 2 diabetes, abdominal obesity, and abnormal blood lipids in addition to hypertension.  · Your caregiver will take your medical and family history and perform a physical exam.  · Diagnostic tests may include blood tests (for glucose, cholesterol, potassium, and kidney function), a urinalysis, or an EKG. Other tests may also be necessary depending on your condition.  PREVENTION   There are important lifestyle issues that you can adopt to reduce your chance of developing hypertension:  · Maintain a normal weight.  · Limit the amount of salt (sodium) in your diet.  · Exercise often.  · Limit alcohol intake.  · Get enough potassium in your diet. Discuss specific advice with your caregiver.  · Follow a DASH diet (dietary approaches to stop hypertension). This diet is rich in fruits, vegetables, and low-fat dairy products, and avoids certain fats.  PROGNOSIS   Essential hypertension cannot be cured. Lifestyle changes and medical treatment can lower blood pressure and reduce complications. The prognosis of secondary hypertension depends on the underlying cause. Many people whose hypertension is controlled with medicine or lifestyle changes can live a normal, healthy life.   RISKS AND COMPLICATIONS   While high " "blood pressure alone is not an illness, it often requires treatment due to its short- and long-term effects on many organs. Hypertension increases your risk for:  · CVAs or strokes (cerebrovascular accident).  · Heart failure due to chronically high blood pressure (hypertensive cardiomyopathy).  · Heart attack (myocardial infarction).  · Damage to the retina (hypertensive retinopathy).  · Kidney failure (hypertensive nephropathy).  Your caregiver can explain list items above that apply to you. Treatment of hypertension can significantly reduce the risk of complications.  TREATMENT   · For overweight patients, weight loss and regular exercise are recommended. Physical fitness lowers blood pressure.  · Mild hypertension is usually treated with diet and exercise. A diet rich in fruits and vegetables, fat-free dairy products, and foods low in fat and salt (sodium) can help lower blood pressure. Decreasing salt intake decreases blood pressure in a 1/3 of people.  · Stop smoking if you are a smoker.  The steps above are highly effective in reducing blood pressure. While these actions are easy to suggest, they are difficult to achieve. Most patients with moderate or severe hypertension end up requiring medications to bring their blood pressure down to a normal level. There are several classes of medications for treatment. Blood pressure pills (antihypertensives) will lower blood pressure by their different actions. Lowering the blood pressure by 10 mmHg may decrease the risk of complications by as much as 25%.  The goal of treatment is effective blood pressure control. This will reduce your risk for complications. Your caregiver will help you determine the best treatment for you according to your lifestyle. What is excellent treatment for one person, may not be for you.  HOME CARE INSTRUCTIONS   · Do not smoke.  · Follow the lifestyle changes outlined in the \"Prevention\" section.  · If you are on medications, follow the " "directions carefully. Blood pressure medications must be taken as prescribed. Skipping doses reduces their benefit. It also puts you at risk for problems.  · Follow up with your caregiver, as directed.  · If you are asked to monitor your blood pressure at home, follow the guidelines in the \"Diagnosis\" section above.  SEEK MEDICAL CARE IF:   · You think you are having medication side effects.  · You have recurrent headaches or lightheadedness.  · You have swelling in your ankles.  · You have trouble with your vision.  SEEK IMMEDIATE MEDICAL CARE IF:   · You have sudden onset of chest pain or pressure, difficulty breathing, or other symptoms of a heart attack.  · You have a severe headache.  · You have symptoms of a stroke (such as sudden weakness, difficulty speaking, difficulty walking).  MAKE SURE YOU:   · Understand these instructions.  · Will watch your condition.  · Will get help right away if you are not doing well or get worse.  Document Released: 12/18/2006 Document Revised: 03/11/2013 Document Reviewed: 07/17/2008  [employer]® Patient Information ©2014 [employer].        Chronic Obstructive Pulmonary Disease  Chronic obstructive pulmonary disease (COPD) is a common lung problem. In COPD, the flow of air from the lungs is limited. The way your lungs work will probably never return to normal, but there are things you can do to improve your lungs and make yourself feel better. Your doctor may treat your condition with:  · Medicines.  · Oxygen.  · Lung surgery.  · Changes to your diet.  · Rehabilitation. This may involve a team of specialists.  Follow these instructions at home:  · Take all medicines as told by your doctor.  · Avoid medicines or cough syrups that dry up your airway (such as antihistamines) and do not allow you to get rid of thick spit. You do not need to avoid them if told differently by your doctor.  · If you smoke, stop. Smoking makes the problem worse.  · Avoid being around things that make " your breathing worse (like smoke, chemicals, and fumes).  · Use oxygen therapy and therapy to help improve your lungs (pulmonary rehabilitation) if told by your doctor. If you need home oxygen therapy, ask your doctor if you should buy a tool to measure your oxygen level (oximeter).  · Avoid people who have a sickness you can catch (contagious).  · Avoid going outside when it is very hot, cold, or humid.  · Eat healthy foods. Eat smaller meals more often. Rest before meals.  · Stay active, but remember to also rest.  · Make sure to get all the shots (vaccines) your doctor recommends. Ask your doctor if you need a pneumonia shot.  · Learn and use tips on how to relax.  · Learn and use tips on how to control your breathing as told by your doctor. Try:  1. Breathing in (inhaling) through your nose for 1 second. Then, pucker your lips and breath out (exhale) through your lips for 2 seconds.  2. Putting one hand on your belly (abdomen). Breathe in slowly through your nose for 1 second. Your hand on your belly should move out. Pucker your lips and breathe out slowly through your lips. Your hand on your belly should move in as you breathe out.  · Learn and use controlled coughing to clear thick spit from your lungs. The steps are:  1. Lean your head a little forward.  2. Breathe in deeply.  3. Try to hold your breath for 3 seconds.  4. Keep your mouth slightly open while coughing 2 times.  5. Spit any thick spit out into a tissue.  6. Rest and do the steps again 1 or 2 times as needed.  Contact a doctor if:  · You cough up more thick spit than usual.  · There is a change in the color or thickness of the spit.  · It is harder to breathe than usual.  · Your breathing is faster than usual.  Get help right away if:  · You have shortness of breath while resting.  · You have shortness of breath that stops you from:  ¨ Being able to talk.  ¨ Doing normal activities.  · You chest hurts for longer than 5 minutes.  · Your skin color  is more blue than usual.  · Your pulse oximeter shows that you have low oxygen for longer than 5 minutes.  This information is not intended to replace advice given to you by your health care provider. Make sure you discuss any questions you have with your health care provider.  Document Released: 06/05/2009 Document Revised: 05/25/2017 Document Reviewed: 08/14/2014  [employer] Interactive Patient Education © 2017 [employer]

## 2018-08-21 NOTE — DISCHARGE PLANNING
Agency/Facility Name: [facility]  Spoke To: Bry  Outcome: They are awaiting a signed form for delivery of oxygen. Confirmed with Bry they have received Approved Service. Requested Bry fax form ASAP so patient may discharge.  JALEN So has been advised.

## 2018-08-21 NOTE — PROGRESS NOTES
Orders to be discharged received. Home oxygen set up for patient. Printed and reviewed AVS, all questions answered. Tele monitor and peripheral IVs removed, wife at bedside to discharge patient home.

## 2018-08-22 NOTE — DISCHARGE PLANNING
Action: Wife called on 8/21 pm and said they could not afford medications and that they were >1k.  8/22 am Spoke to pt.  He said the only medication he needs are lisinopril, hydrochlorothiazide and pravastatin.  The others he had already filled previously. This RN CM called [facility] at [facility] and they were able to find the paper RX.  Pt told to go to [facility] that 2 medications were $4 and one $16.  Pt said that was affordable and will go to [facility] to  meds and take them to [facility].  Pt also stated that he never got the home O2 delivered.  This RN CM called [facility] and they said he never called for delivery.  [facility] will call pt and schedule delivery today. Wife was told that he could change MD to STEPHANIE and that they had discount medications for patients.        ADDENDUM:  Spoke to pt.  [facility] is delivering equipment now.

## 2018-08-30 NOTE — ADDENDUM NOTE
Encounter addended by: Gallito Szymanski D.O. on: 8/30/2018  9:50 AM<BR>    Actions taken: Charge Capture section accepted

## 2018-09-06 ENCOUNTER — OFFICE VISIT (OUTPATIENT)
Dept: OCCUPATIONAL MEDICINE | Facility: CLINIC | Age: 63
End: 2018-09-06

## 2018-09-06 ENCOUNTER — NON-PROVIDER VISIT (OUTPATIENT)
Dept: OCCUPATIONAL MEDICINE | Facility: CLINIC | Age: 63
End: 2018-09-06

## 2018-09-06 VITALS
DIASTOLIC BLOOD PRESSURE: 110 MMHG | HEART RATE: 100 BPM | BODY MASS INDEX: 32.5 KG/M2 | RESPIRATION RATE: 18 BRPM | WEIGHT: 227 LBS | OXYGEN SATURATION: 90 % | HEIGHT: 70 IN | SYSTOLIC BLOOD PRESSURE: 210 MMHG | TEMPERATURE: 98.9 F

## 2018-09-06 DIAGNOSIS — Z02.4 ENCOUNTER FOR COMMERCIAL DRIVER MEDICAL EXAMINATION (CDME): ICD-10-CM

## 2018-09-06 DIAGNOSIS — I16.0 HYPERTENSIVE URGENCY: ICD-10-CM

## 2018-09-06 DIAGNOSIS — Z02.89 ENCOUNTER FOR OCCUPATIONAL HEALTH EXAMINATION: ICD-10-CM

## 2018-09-06 PROCEDURE — 80305 DRUG TEST PRSMV DIR OPT OBS: CPT | Performed by: PREVENTIVE MEDICINE

## 2018-09-07 NOTE — PROGRESS NOTES
Patient presented for DOT examination.  He was seen by the MA and blood pressure was found to be 210/110.  Patient left AMA before I was able to speak with him.

## 2018-10-02 ENCOUNTER — HOSPITAL ENCOUNTER (EMERGENCY)
Facility: MEDICAL CENTER | Age: 63
End: 2018-10-02
Attending: EMERGENCY MEDICINE

## 2018-10-02 VITALS
DIASTOLIC BLOOD PRESSURE: 99 MMHG | WEIGHT: 226.41 LBS | TEMPERATURE: 97.5 F | RESPIRATION RATE: 12 BRPM | BODY MASS INDEX: 31.7 KG/M2 | SYSTOLIC BLOOD PRESSURE: 169 MMHG | OXYGEN SATURATION: 91 % | HEIGHT: 71 IN | HEART RATE: 100 BPM

## 2018-10-02 DIAGNOSIS — Z76.0 MEDICATION REFILL: ICD-10-CM

## 2018-10-02 PROCEDURE — 99283 EMERGENCY DEPT VISIT LOW MDM: CPT

## 2018-10-02 PROCEDURE — 304561 HCHG STAT O2

## 2018-10-02 RX ORDER — ALBUTEROL SULFATE 90 UG/1
2 AEROSOL, METERED RESPIRATORY (INHALATION) EVERY 4 HOURS PRN
Qty: 18 G | Refills: 3 | Status: SHIPPED | OUTPATIENT
Start: 2018-10-02

## 2018-10-02 ASSESSMENT — LIFESTYLE VARIABLES: DO YOU DRINK ALCOHOL: NO

## 2018-10-02 NOTE — ED TRIAGE NOTES
"Chief Complaint   Patient presents with   • Medication Refill     reports he needs albuterol and anoro medications for COPD filled.      Pt ambulatory to triage for above. Reports he is almost out of above medications. Doesn't have a PCP.     Pt returned to lobby. Educated on triage process and to inform staff of any changes.     BP (!) 169/99   Pulse 100   Temp 36.4 °C (97.5 °F)   Resp 12   Ht 1.803 m (5' 11\")   Wt 102.7 kg (226 lb 6.6 oz)   SpO2 91%   BMI 31.58 kg/m²     "

## 2018-10-02 NOTE — DISCHARGE INSTRUCTIONS
Please call a primary doctor for follow-up.  Return for new or concerning symptoms to include trouble breathing or other concerns

## 2018-10-02 NOTE — ED PROVIDER NOTES
ED Provider Note    Scribed for Elias Lraa M.D. by Isaiah Brandt. 10/2/2018, 2:28 PM.    Primary care provider: Nichole Wood M.D.  Means of arrival: Private Vehicle  History obtained from: Patient  History limited by: None    CHIEF COMPLAINT  Chief Complaint   Patient presents with   • Medication Refill     reports he needs albuterol and anoro medications for COPD filled.        HPI  Zander Domínguez is a 63 y.o. male who presents to the Emergency Department for a medication refill. The patient reports that he has a history of COPD and reports that he is about to run out of albuterol and anoro medications. The patient states he is on home oxygen at 2 liters chronically. He endorses a clear productive cough and otherwise has no complaints and reports that he has been feeling at baseline. The patient reports that he does not have a primary care at this time as he Is having some issues with insurance. He denies any other complaints at this time.     REVIEW OF SYSTEMS  Pertinent positives include medical refill request, clear productive cough. Pertinent negatives include no further complaints.  See HPI for further details.    PAST MEDICAL HISTORY   has a past medical history of Chronic obstructive pulmonary disease (HCC) and HTN (hypertension), benign (3/16/2012).    SURGICAL HISTORY  None pertinent     SOCIAL HISTORY  Social History   Substance Use Topics   • Smoking status: Former Smoker     Packs/day: 1.00     Years: 30.00     Types: Cigarettes     Quit date: 1/1/2006   • Smokeless tobacco: Never Used   • Alcohol use No      Comment: rare      History   Drug Use No       FAMILY HISTORY  Family History   Problem Relation Age of Onset   • Hypertension Mother    • Diabetes Neg Hx    • Cancer Neg Hx    • Psychiatry Neg Hx    • Heart Disease Neg Hx        CURRENT MEDICATIONS  Home Medications     Reviewed by Brandon Montgomery R.N. (Registered Nurse) on 10/02/18 at 1403  Med List Status: Partial   Medication Last Dose  "Status   albuterol (VENTOLIN HFA) 108 (90 Base) MCG/ACT Aero Soln inhalation aerosol  Active   albuterol 108 (90 Base) MCG/ACT Aero Soln inhalation aerosol  Active   amLODIPine (NORVASC) 10 MG Tab  Active   amLODIPine (NORVASC) 10 MG Tab  Active   ANORO ELLIPTA 62.5-25 MCG/INH AEROSOL POWDER, BREATH ACTIVATED inhaler  Active   hydroCHLOROthiazide (HYDRODIURIL) 25 MG Tab  Active   lisinopril (PRINIVIL) 20 MG Tab  Active   lisinopril-hydrochlorothiazide (PRINZIDE, ZESTORETIC) 20-12.5 MG per tablet  Active   pravastatin (PRAVACHOL) 20 MG Tab  Active   tiotropium (SPIRIVA) 18 MCG Cap  Active   umeclidinium-vilanterol (ANORO ELLIPTA) 62.5-25 MCG/INH AEROSOL POWDER, BREATH ACTIVATED inhaler 8/17/2018 Active                ALLERGIES  No Known Allergies    PHYSICAL EXAM  VITAL SIGNS: BP (!) 169/99   Pulse 100   Temp 36.4 °C (97.5 °F)   Resp 12   Ht 1.803 m (5' 11\")   Wt 102.7 kg (226 lb 6.6 oz)   SpO2 91%   BMI 31.58 kg/m²     Constitutional: Well developed, Well nourished, No acute distress, Non-toxic appearance.   HENT: Normocephalic, Atraumatic. Oropharynx normal and moist. TMs clear bilaterally,   Cardiovascular: Regular pulse  Lungs: No respiratory distress. Faint expiratory wheezes bilaterally with mildly diminished air movement. Skin: Warm, Dry, no rash  Extremities: No edema  Neurologic: Alert, appropriate, follows commands  Psychiatric: Affect normal    COURSE & MEDICAL DECISION MAKING  Nursing notes, VS, PMSFHx reviewed in chart.     2:28 PM Patient seen and examined at bedside. Ordered for a medication refill to evaluate. I spoke to the patient about his visit today and informed him I would write him for his medications and that I would try to get him a referral to follow up with primary care. The patient was agreeable with his care and discharge home at this time.     Decision Making:  This is a 63 y.o. year old male who presents with a request for medication refill.  He has baseline COPD and is about to " run out of his meds.  He still has albuterol but did run out of the anora.  The patient will be given medication refills and follow-up with a primary doctor.  At this time he appears stable with unremarkable vital signs.    The patient will return for new or worsening symptoms and is stable at the time of discharge.    DISPOSITION:  Patient will be discharged home in stable condition.    FOLLOW UP:  Aislinn Husain M.D.  [address] [address]  [phone]    Schedule an appointment as soon as possible for a visit today        OUTPATIENT MEDICATIONS:  Current Discharge Medication List      albuterol / anora      FINAL IMPRESSION  1. Medication refill      Isaiah ALTAMIRANO (Scribe), am scribing for, and in the presence of, Elias Lara M.D..    Electronically signed by: Isaiah Brandt (Scribe), 10/2/2018    Elias ALTAMIRANO M.D. personally performed the services described in this documentation, as scribed by Isaiah Brandt in my presence, and it is both accurate and complete. E.     The note accurately reflects work and decisions made by me.  Elias Lara  10/2/2018  2:36 PM

## 2018-10-02 NOTE — ED NOTES
Verbalizes understanding of discharge and followup instructions.Given Rx's x2. Ambulates with steady gait to discharge.

## 2018-10-03 ENCOUNTER — PATIENT OUTREACH (OUTPATIENT)
Dept: HEALTH INFORMATION MANAGEMENT | Facility: OTHER | Age: 63
End: 2018-10-03

## 2019-11-14 ENCOUNTER — OFFICE VISIT (OUTPATIENT)
Dept: PULMONOLOGY | Facility: HOSPICE | Age: 64
End: 2019-11-14
Payer: COMMERCIAL

## 2019-11-14 VITALS
WEIGHT: 247 LBS | HEART RATE: 93 BPM | RESPIRATION RATE: 16 BRPM | HEIGHT: 70 IN | TEMPERATURE: 97.5 F | OXYGEN SATURATION: 92 % | SYSTOLIC BLOOD PRESSURE: 144 MMHG | DIASTOLIC BLOOD PRESSURE: 94 MMHG | BODY MASS INDEX: 35.36 KG/M2

## 2019-11-14 DIAGNOSIS — J44.9 CHRONIC OBSTRUCTIVE PULMONARY DISEASE, UNSPECIFIED COPD TYPE (HCC): ICD-10-CM

## 2019-11-14 DIAGNOSIS — E66.9 CLASS 2 OBESITY WITH BODY MASS INDEX (BMI) OF 35.0 TO 35.9 IN ADULT, UNSPECIFIED OBESITY TYPE, UNSPECIFIED WHETHER SERIOUS COMORBIDITY PRESENT: ICD-10-CM

## 2019-11-14 DIAGNOSIS — J96.91 RESPIRATORY FAILURE WITH HYPOXIA, UNSPECIFIED CHRONICITY (HCC): ICD-10-CM

## 2019-11-14 DIAGNOSIS — Z87.891 FORMER SMOKER: ICD-10-CM

## 2019-11-14 PROCEDURE — 94761 N-INVAS EAR/PLS OXIMETRY MLT: CPT | Performed by: INTERNAL MEDICINE

## 2019-11-14 PROCEDURE — 99204 OFFICE O/P NEW MOD 45 MIN: CPT | Mod: 25 | Performed by: INTERNAL MEDICINE

## 2019-11-14 ASSESSMENT — ENCOUNTER SYMPTOMS
TREMORS: 0
FOCAL WEAKNESS: 0
NAUSEA: 0
DEPRESSION: 0
NECK PAIN: 0
DIARRHEA: 0
FALLS: 0
VOMITING: 0
COUGH: 0
CHILLS: 0
WHEEZING: 0
EYE PAIN: 0
MYALGIAS: 0
PALPITATIONS: 0
PHOTOPHOBIA: 0
DIZZINESS: 0
SPEECH CHANGE: 0
DIAPHORESIS: 0
PND: 0
EYE DISCHARGE: 0
EYE REDNESS: 0
SHORTNESS OF BREATH: 0
SORE THROAT: 0
BACK PAIN: 0
FEVER: 0
HEADACHES: 0
DOUBLE VISION: 0
BLURRED VISION: 0
ABDOMINAL PAIN: 0
SINUS PAIN: 0
SPUTUM PRODUCTION: 0
CLAUDICATION: 0
ORTHOPNEA: 0
STRIDOR: 0
CONSTIPATION: 0
WEIGHT LOSS: 0
WEAKNESS: 0
HEMOPTYSIS: 0
HEARTBURN: 0

## 2019-11-14 NOTE — PROGRESS NOTES
Chief Complaint   Patient presents with   • Establish Care     referral 9/27/19 ARLYN Sneed DX COPD [facility] Jennifer 8/17/18 consult KOKO Xavier MD        HPI: This patient is a 64 y.o. male presenting for evaluation of COPD and chronic hypoxic respiratory failure.  The patient's past medical history is significant for poorly controlled hypertension on 3 oral agents, COPD on supplemental oxygen since a hospital stay in 2018 with no history of pulmonary function test, dyslipidemia.  Patient has no surgical history.  He reports a history of tobacco use having quit in 2005 with 20+ pack year history.  He is retired from work in construction as a  as of 2018.  There is no family history of heart or lung disease.  No family history of autoimmune disease.  The patient was hospitalized for hypertensive episode in August 2018 during which he underwent a CT angiogram when he was found to be hypoxic.  This did not show any evidence of PE but did show emphysematous changes and to pulmonary nodules largest of 5 mm in size.  No associated lymphadenopathy.  He has been on supplemental oxygen 24 hours a day since that time but has not been followed regularly by a pulmonary physician.  He is taking anoro, Flovent and as needed short acting bronchodilators which he was put on following his hospital stay.  He does not use his rescue bronchodilator more than once per month and he does walk 3-4 times daily with his dog.  He denies chronic cough, chest pain, fevers, chills, night sweats.  He does report significant weight gain over the past 1 to 2 years.  He does report snoring but denies apneas.  He sleeps well typically from around 7:53 in the evening until 4 in the morning.  He denies symptoms of excessive daytime sleepiness although was noted to have mild elevation of hemoglobin during his stay in August.  No witnessed apneas.    Past Medical History:   Diagnosis Date   • Chronic obstructive pulmonary disease (HCC)    • HTN  (hypertension), benign 3/16/2012       Social History     Socioeconomic History   • Marital status:      Spouse name: Not on file   • Number of children: Not on file   • Years of education: Not on file   • Highest education level: Not on file   Occupational History   • Not on file   Social Needs   • Financial resource strain: Not on file   • Food insecurity:     Worry: Not on file     Inability: Not on file   • Transportation needs:     Medical: Not on file     Non-medical: Not on file   Tobacco Use   • Smoking status: Former Smoker     Packs/day: 1.00     Years: 30.00     Pack years: 30.00     Types: Cigarettes     Last attempt to quit: 2006     Years since quittin.8   • Smokeless tobacco: Never Used   Substance and Sexual Activity   • Alcohol use: No     Alcohol/week: 0.0 oz     Comment: rare   • Drug use: No   • Sexual activity: Yes     Partners: Male   Lifestyle   • Physical activity:     Days per week: Not on file     Minutes per session: Not on file   • Stress: Not on file   Relationships   • Social connections:     Talks on phone: Not on file     Gets together: Not on file     Attends [employer] service: Not on file     Active member of club or organization: Not on file     Attends meetings of clubs or organizations: Not on file     Relationship status: Not on file   • Intimate partner violence:     Fear of current or ex partner: Not on file     Emotionally abused: Not on file     Physically abused: Not on file     Forced sexual activity: Not on file   Other Topics Concern   • Not on file   Social History Narrative   • Not on file       Family History   Problem Relation Age of Onset   • Hypertension Mother    • Diabetes Neg Hx    • Cancer Neg Hx    • Psychiatric Illness Neg Hx    • Heart Disease Neg Hx        Current Outpatient Medications on File Prior to Visit   Medication Sig Dispense Refill   • FLOVENT DISKUS 100 MCG/BLIST AEROSOL POWDER, BREATH ACTIVATED INHALE 1 PUFF BY MOUTH TWO TIMES A  DAY FOR COPD - RINSE MOUTH AFTER EACH USE  0   • ANORO ELLIPTA 62.5-25 MCG/INH AEROSOL POWDER, BREATH ACTIVATED inhaler INHALE 1 PUFF BY MOUTH EVERY DAY 1 Inhaler 0   • albuterol (VENTOLIN HFA) 108 (90 Base) MCG/ACT Aero Soln inhalation aerosol Inhale 2 Puffs by mouth every four hours as needed. FOR SHORTNESS OF BREATH 18 g 3   • lisinopril (PRINIVIL) 20 MG Tab Take 1 Tab by mouth every day. (Patient taking differently: Take 40 mg by mouth every day.) 90 Tab 0   • pravastatin (PRAVACHOL) 20 MG Tab Take 1 Tab by mouth every bedtime. 30 Tab 1   • hydroCHLOROthiazide (HYDRODIURIL) 25 MG Tab Take 1 Tab by mouth every day. 30 Tab 1   • amLODIPine (NORVASC) 10 MG Tab Take 1 Tab by mouth every day. 90 Tab 0   • albuterol 108 (90 Base) MCG/ACT Aero Soln inhalation aerosol Inhale 2 Puffs by mouth every 6 hours as needed for Shortness of Breath. 1 Inhaler 1   • amLODIPine (NORVASC) 10 MG Tab Take 1 Tab by mouth every day. 90 Tab 0   • lisinopril-hydrochlorothiazide (PRINZIDE, ZESTORETIC) 20-12.5 MG per tablet TAKE 1 TABLET BY MOUTH TWICE DAILY (Patient not taking: Reported on 11/14/2019) 60 Tab 0   • tiotropium (SPIRIVA) 18 MCG Cap Inhale 1 Cap by mouth every day. (Patient not taking: Reported on 11/14/2019) 30 Cap 3   • umeclidinium-vilanterol (ANORO ELLIPTA) 62.5-25 MCG/INH AEROSOL POWDER, BREATH ACTIVATED inhaler Inhale 1 Puff by mouth every day.       No current facility-administered medications on file prior to visit.        Allergies: Patient has no known allergies.    ROS:   Review of Systems   Constitutional: Negative for chills, diaphoresis, fever, malaise/fatigue and weight loss.   HENT: Negative for congestion, ear discharge, ear pain, hearing loss, nosebleeds, sinus pain, sore throat and tinnitus.    Eyes: Negative for blurred vision, double vision, photophobia, pain, discharge and redness.   Respiratory: Negative for cough, hemoptysis, sputum production, shortness of breath, wheezing and stridor.   "  Cardiovascular: Negative for chest pain, palpitations, orthopnea, claudication, leg swelling and PND.   Gastrointestinal: Negative for abdominal pain, constipation, diarrhea, heartburn, nausea and vomiting.   Genitourinary: Negative for dysuria and urgency.   Musculoskeletal: Negative for back pain, falls, joint pain, myalgias and neck pain.   Skin: Negative for itching and rash.   Neurological: Negative for dizziness, tremors, speech change, focal weakness, weakness and headaches.   Endo/Heme/Allergies: Negative for environmental allergies.   Psychiatric/Behavioral: Negative for depression.       /94 (BP Location: Left arm, Patient Position: Sitting, BP Cuff Size: Large adult)   Pulse 93   Temp 36.4 °C (97.5 °F) (Temporal)   Resp 16   Ht 1.778 m (5' 10\")   Wt 112 kg (247 lb)   SpO2 92%     Physical Exam:  Physical Exam  Constitutional:       General: He is not in acute distress.     Appearance: Normal appearance. He is obese.   HENT:      Head: Normocephalic and atraumatic.      Right Ear: External ear normal.      Left Ear: External ear normal.      Nose: Nose normal.      Mouth/Throat:      Mouth: Mucous membranes are moist.      Pharynx: Oropharynx is clear. No oropharyngeal exudate.   Eyes:      General: No scleral icterus.     Extraocular Movements: Extraocular movements intact.      Pupils: Pupils are equal, round, and reactive to light.      Comments: B/l conjunctival injection   Neck:      Musculoskeletal: Normal range of motion and neck supple.   Cardiovascular:      Rate and Rhythm: Normal rate and regular rhythm.      Heart sounds: No murmur. No friction rub. No gallop.    Pulmonary:      Effort: Pulmonary effort is normal. No respiratory distress.      Breath sounds: No wheezing or rales.      Comments: Decreased bs b/l at apices  Abdominal:      Comments: obese   Musculoskeletal: Normal range of motion.      Right lower leg: Edema present.      Left lower leg: Edema present.      " Comments: Trace b/l LE edema   Skin:     General: Skin is warm and dry.      Findings: No rash.   Neurological:      General: No focal deficit present.      Mental Status: He is alert and oriented to person, place, and time.      Cranial Nerves: No cranial nerve deficit.   Psychiatric:         Mood and Affect: Mood normal.         Behavior: Behavior normal.         PFTs as reviewed by me personally: none    Imaging as reviewed by me personally: as per HPI    Assessment:  1. Former smoker  CT-CHEST ([facility]ORAX) W/O   2. Lung nodule < 6cm on CT  CT-CHEST (THORAX) W/O   3. Chronic obstructive pulmonary disease, unspecified COPD type (HCC)  PULMONARY FUNCTION TESTS -Test requested: Complete Pulmonary Function Test   4. Respiratory failure with hypoxia, unspecified chronicity (HCC)  PULMONARY FUNCTION TESTS -Test requested: Complete Pulmonary Function Test    Multiple Oximetry    DME [facility]   5. Class 2 obesity with body mass index (BMI) of 35.0 to 35.9 in adult, unspecified obesity type, unspecified whether serious comorbidity present         Plan:  1.  Patient does not meet strict criteria for low-dose CT chest to screen for lung cancer given history is presumed less than 30 pack years although he does have emphysematous changes on CT and small pulmonary nodule.  I encouraged ongoing abstinence and we will follow-up nodule as per below.  2.  Patient with 2 nodules both less than 6 mm in size.  We will repeat CT chest to ensure no growth.  3.  This is presumed based on hypoxia and emphysema seen on CT.  He has had no history of exacerbations or use of rescue bronchodilator therefore we will continue his current regimen and obtain full pulmonary function test at follow-up.  He is up-to-date on vaccines.  He is tobacco free.  4.  I suspect this is secondary to #3.  He did desaturate when taken off oxygen in clinic today.  We have updated his oxygen order and we will continue supplemental oxygen.  He is tobacco free  and I encouraged ongoing abstinence.  We will obtain full pulmonary function test at follow-up and if indicated refer to pulmonary rehab.  We will also screen for obstructive sleep apnea with overnight oximetry on supplemental oxygen.  5.  Patient is at risk for obesity hypoventilation syndrome as well as obstructive sleep apnea.  I am concerned for sleep apnea given poorly controlled hypertension.  We will screen him for this as per above and encouraged healthy lifestyle habits.  Continue daily walks.  Return in about 8 weeks (around 1/9/2020).

## 2019-11-14 NOTE — PROCEDURES
Multi-Ox Readings  Multi Ox #1 Room air   O2 sat % at rest 88   O2 sat % on exertion     O2 sat average on exertion     Multi Ox #2 2 LPM   O2 sat % at rest 95   O2 sat % on exertion 91   O2 sat average on exertion       Oxygen Use 2   Oxygen Frequency 24/7   Duration of need     Is the patient mobile within the home?     CPAP Use?     BIPAP Use?     Servo Titration

## 2019-12-02 ENCOUNTER — TELEPHONE (OUTPATIENT)
Dept: PULMONOLOGY | Facility: HOSPICE | Age: 64
End: 2019-12-02

## 2019-12-02 DIAGNOSIS — J96.91 RESPIRATORY FAILURE WITH HYPOXIA, UNSPECIFIED CHRONICITY (HCC): ICD-10-CM

## 2019-12-02 NOTE — TELEPHONE ENCOUNTER
Corrine Livingston M.D.  Arminda Bowman, Med Ass't             I meant to forward you the referral to sleep medicine but signed before cc'ing you.  Can you let him know OPO was abnormal and we are referring to sleep medicine and just ensure he is using O2 at night?  Thank you!!

## 2019-12-02 NOTE — PROGRESS NOTES
Pt with significant desaturation on OPO.  We will refer to sleep medicine for sleep disordered breathing and continue supplemental O2 use at night.

## 2019-12-02 NOTE — TELEPHONE ENCOUNTER
LVM informed per OPO results abnormal referral placed to Sleep center. encouraged to call back and confirm oxygen use.

## 2019-12-03 ENCOUNTER — HOSPITAL ENCOUNTER (OUTPATIENT)
Dept: RADIOLOGY | Facility: MEDICAL CENTER | Age: 64
End: 2019-12-03
Attending: INTERNAL MEDICINE
Payer: COMMERCIAL

## 2019-12-03 DIAGNOSIS — Z87.891 FORMER SMOKER: ICD-10-CM

## 2019-12-03 PROCEDURE — 71250 CT THORAX DX C-: CPT

## 2020-01-02 ENCOUNTER — TELEPHONE (OUTPATIENT)
Dept: PULMONOLOGY | Facility: HOSPICE | Age: 65
End: 2020-01-02

## 2020-01-03 NOTE — TELEPHONE ENCOUNTER
LM TO [facility] APPT FOR PULMONARY OFFICE, PATIENT HAS [employer] MEDICAID AND CAN'T DO TESTING WITH [facility].WE CAN TRY NEXT MONTH IF THE PATIENTS INS CHANGES. CANDICE TERMED 12/31/19/ap

## 2020-04-02 ENCOUNTER — APPOINTMENT (OUTPATIENT)
Dept: SLEEP MEDICINE | Facility: MEDICAL CENTER | Age: 65
End: 2020-04-02

## 2021-03-03 DIAGNOSIS — Z23 NEED FOR VACCINATION: ICD-10-CM

## 2022-10-24 NOTE — PROGRESS NOTES
Monitor Summary  -ST   (O) PVC  .14/.8/.38   Azithromycin Counseling:  I discussed with the patient the risks of azithromycin including but not limited to GI upset, allergic reaction, drug rash, diarrhea, and yeast infections.